# Patient Record
Sex: FEMALE | Race: WHITE | NOT HISPANIC OR LATINO | Employment: STUDENT | URBAN - METROPOLITAN AREA
[De-identification: names, ages, dates, MRNs, and addresses within clinical notes are randomized per-mention and may not be internally consistent; named-entity substitution may affect disease eponyms.]

---
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
## 2021-08-11 PROBLEM — F42.9 OBSESSIVE-COMPULSIVE DISORDER: Status: ACTIVE | Noted: 2021-08-11

## 2021-08-11 PROBLEM — B07.8 OTHER VIRAL WARTS: Status: ACTIVE | Noted: 2021-08-11

## 2021-09-10 ENCOUNTER — TELEPHONE (OUTPATIENT)
Dept: PSYCHIATRY | Facility: CLINIC | Age: 21
End: 2021-09-10

## 2021-09-13 ENCOUNTER — OFFICE VISIT (OUTPATIENT)
Dept: PSYCHIATRY | Facility: CLINIC | Age: 21
End: 2021-09-13
Payer: OTHER GOVERNMENT

## 2021-09-13 DIAGNOSIS — F32.9 DEPRESSION, REACTIVE: ICD-10-CM

## 2021-09-13 DIAGNOSIS — F42.9 OBSESSIVE-COMPULSIVE DISORDER, UNSPECIFIED TYPE: ICD-10-CM

## 2021-09-13 DIAGNOSIS — R45.88 NON-SUICIDAL SELF HARM: Primary | ICD-10-CM

## 2021-09-13 DIAGNOSIS — F41.9 ANXIETY: ICD-10-CM

## 2021-09-13 PROCEDURE — 99211 OFF/OP EST MAY X REQ PHY/QHP: CPT | Mod: PBBFAC,PO | Performed by: SOCIAL WORKER

## 2021-09-13 PROCEDURE — 90791 PSYCH DIAGNOSTIC EVALUATION: CPT | Mod: ,,, | Performed by: SOCIAL WORKER

## 2021-09-13 PROCEDURE — 90791 PR PSYCHIATRIC DIAGNOSTIC EVALUATION: ICD-10-PCS | Mod: ,,, | Performed by: SOCIAL WORKER

## 2021-09-13 PROCEDURE — 99999 PR PBB SHADOW E&M-EST. PATIENT-LVL I: ICD-10-PCS | Mod: PBBFAC,,, | Performed by: SOCIAL WORKER

## 2021-09-13 PROCEDURE — 99999 PR PBB SHADOW E&M-EST. PATIENT-LVL I: CPT | Mod: PBBFAC,,, | Performed by: SOCIAL WORKER

## 2021-09-15 PROBLEM — F32.9 DEPRESSION, REACTIVE: Status: ACTIVE | Noted: 2021-09-15

## 2021-09-15 PROBLEM — F41.9 ANXIETY: Status: ACTIVE | Noted: 2021-09-15

## 2021-09-15 PROBLEM — R45.88 NON-SUICIDAL SELF HARM: Status: ACTIVE | Noted: 2021-09-15

## 2021-09-24 ENCOUNTER — TELEPHONE (OUTPATIENT)
Dept: PSYCHIATRY | Facility: CLINIC | Age: 21
End: 2021-09-24

## 2021-09-27 ENCOUNTER — OFFICE VISIT (OUTPATIENT)
Dept: PSYCHIATRY | Facility: CLINIC | Age: 21
End: 2021-09-27
Payer: OTHER GOVERNMENT

## 2021-09-27 DIAGNOSIS — F41.9 ANXIETY: Primary | ICD-10-CM

## 2021-09-27 DIAGNOSIS — R45.88 NON-SUICIDAL SELF HARM: ICD-10-CM

## 2021-09-27 PROCEDURE — 90834 PSYTX W PT 45 MINUTES: CPT | Mod: ,,, | Performed by: SOCIAL WORKER

## 2021-09-27 PROCEDURE — 99999 PR PBB SHADOW E&M-EST. PATIENT-LVL I: CPT | Mod: PBBFAC,,, | Performed by: SOCIAL WORKER

## 2021-09-27 PROCEDURE — 99211 OFF/OP EST MAY X REQ PHY/QHP: CPT | Mod: PBBFAC,PO | Performed by: SOCIAL WORKER

## 2021-09-27 PROCEDURE — 90834 PR PSYCHOTHERAPY W/PATIENT, 45 MIN: ICD-10-PCS | Mod: ,,, | Performed by: SOCIAL WORKER

## 2021-09-27 PROCEDURE — 99999 PR PBB SHADOW E&M-EST. PATIENT-LVL I: ICD-10-PCS | Mod: PBBFAC,,, | Performed by: SOCIAL WORKER

## 2021-10-11 ENCOUNTER — OFFICE VISIT (OUTPATIENT)
Dept: PSYCHIATRY | Facility: CLINIC | Age: 21
End: 2021-10-11
Payer: OTHER GOVERNMENT

## 2021-10-11 DIAGNOSIS — F41.9 ANXIETY: ICD-10-CM

## 2021-10-11 DIAGNOSIS — F32.9 DEPRESSION, REACTIVE: Primary | ICD-10-CM

## 2021-10-11 PROCEDURE — 90837 PSYTX W PT 60 MINUTES: CPT | Mod: ,,, | Performed by: SOCIAL WORKER

## 2021-10-11 PROCEDURE — 90837 PR PSYCHOTHERAPY W/PATIENT, 60 MIN: ICD-10-PCS | Mod: ,,, | Performed by: SOCIAL WORKER

## 2021-10-22 ENCOUNTER — TELEPHONE (OUTPATIENT)
Dept: PSYCHIATRY | Facility: CLINIC | Age: 21
End: 2021-10-22

## 2021-10-25 ENCOUNTER — OFFICE VISIT (OUTPATIENT)
Dept: PSYCHIATRY | Facility: CLINIC | Age: 21
End: 2021-10-25
Payer: OTHER GOVERNMENT

## 2021-10-25 DIAGNOSIS — F32.9 DEPRESSION, REACTIVE: ICD-10-CM

## 2021-10-25 DIAGNOSIS — F41.1 GENERALIZED ANXIETY DISORDER: Primary | ICD-10-CM

## 2021-10-25 PROCEDURE — 90834 PSYTX W PT 45 MINUTES: CPT | Mod: ,,, | Performed by: SOCIAL WORKER

## 2021-10-25 PROCEDURE — 90834 PR PSYCHOTHERAPY W/PATIENT, 45 MIN: ICD-10-PCS | Mod: ,,, | Performed by: SOCIAL WORKER

## 2021-10-26 PROBLEM — F41.1 GENERALIZED ANXIETY DISORDER: Status: ACTIVE | Noted: 2021-10-26

## 2021-11-08 ENCOUNTER — OFFICE VISIT (OUTPATIENT)
Dept: PSYCHIATRY | Facility: CLINIC | Age: 21
End: 2021-11-08
Payer: OTHER GOVERNMENT

## 2021-11-08 DIAGNOSIS — F41.1 GENERALIZED ANXIETY DISORDER: Primary | ICD-10-CM

## 2021-11-08 DIAGNOSIS — F42.9 OBSESSIVE-COMPULSIVE DISORDER, UNSPECIFIED TYPE: ICD-10-CM

## 2021-11-08 DIAGNOSIS — F32.9 DEPRESSION, REACTIVE: ICD-10-CM

## 2021-11-08 PROCEDURE — 90834 PSYTX W PT 45 MINUTES: CPT | Mod: ,,, | Performed by: SOCIAL WORKER

## 2021-11-08 PROCEDURE — 90834 PR PSYCHOTHERAPY W/PATIENT, 45 MIN: ICD-10-PCS | Mod: ,,, | Performed by: SOCIAL WORKER

## 2021-11-19 ENCOUNTER — TELEPHONE (OUTPATIENT)
Dept: PSYCHIATRY | Facility: CLINIC | Age: 21
End: 2021-11-19
Payer: OTHER GOVERNMENT

## 2021-11-29 ENCOUNTER — OFFICE VISIT (OUTPATIENT)
Dept: PSYCHIATRY | Facility: CLINIC | Age: 21
End: 2021-11-29
Payer: OTHER GOVERNMENT

## 2021-11-29 DIAGNOSIS — F41.1 GENERALIZED ANXIETY DISORDER: Primary | ICD-10-CM

## 2021-11-29 DIAGNOSIS — F42.9 OBSESSIVE-COMPULSIVE DISORDER, UNSPECIFIED TYPE: ICD-10-CM

## 2021-11-29 DIAGNOSIS — F32.9 DEPRESSION, REACTIVE: ICD-10-CM

## 2021-11-29 PROCEDURE — 90837 PSYTX W PT 60 MINUTES: CPT | Mod: ,,, | Performed by: SOCIAL WORKER

## 2021-11-29 PROCEDURE — 90837 PR PSYCHOTHERAPY W/PATIENT, 60 MIN: ICD-10-PCS | Mod: ,,, | Performed by: SOCIAL WORKER

## 2021-12-14 ENCOUNTER — TELEPHONE (OUTPATIENT)
Dept: PSYCHIATRY | Facility: CLINIC | Age: 21
End: 2021-12-14
Payer: OTHER GOVERNMENT

## 2021-12-15 ENCOUNTER — TELEPHONE (OUTPATIENT)
Dept: PSYCHIATRY | Facility: CLINIC | Age: 21
End: 2021-12-15
Payer: OTHER GOVERNMENT

## 2021-12-15 ENCOUNTER — OFFICE VISIT (OUTPATIENT)
Dept: PSYCHIATRY | Facility: CLINIC | Age: 21
End: 2021-12-15
Payer: OTHER GOVERNMENT

## 2021-12-15 DIAGNOSIS — F42.9 OBSESSIVE-COMPULSIVE DISORDER, UNSPECIFIED TYPE: ICD-10-CM

## 2021-12-15 DIAGNOSIS — F32.9 DEPRESSION, REACTIVE: ICD-10-CM

## 2021-12-15 DIAGNOSIS — F41.1 GENERALIZED ANXIETY DISORDER: Primary | ICD-10-CM

## 2021-12-15 PROCEDURE — 90834 PR PSYCHOTHERAPY W/PATIENT, 45 MIN: ICD-10-PCS | Mod: 95,,, | Performed by: SOCIAL WORKER

## 2021-12-15 PROCEDURE — 90834 PSYTX W PT 45 MINUTES: CPT | Mod: 95,,, | Performed by: SOCIAL WORKER

## 2022-02-21 ENCOUNTER — TELEPHONE (OUTPATIENT)
Dept: PSYCHIATRY | Facility: CLINIC | Age: 22
End: 2022-02-21
Payer: OTHER GOVERNMENT
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
## 2022-02-22 ENCOUNTER — OFFICE VISIT (OUTPATIENT)
Dept: PSYCHIATRY | Facility: CLINIC | Age: 22
End: 2022-02-22
Payer: OTHER GOVERNMENT

## 2022-02-22 DIAGNOSIS — F41.1 GENERALIZED ANXIETY DISORDER: Primary | ICD-10-CM

## 2022-02-22 DIAGNOSIS — F42.9 OBSESSIVE-COMPULSIVE DISORDER, UNSPECIFIED TYPE: ICD-10-CM

## 2022-02-22 DIAGNOSIS — F32.9 DEPRESSION, REACTIVE: ICD-10-CM

## 2022-02-22 PROCEDURE — 90834 PSYTX W PT 45 MINUTES: CPT | Mod: S$PBB,,, | Performed by: SOCIAL WORKER

## 2022-02-22 PROCEDURE — 90834 PR PSYCHOTHERAPY W/PATIENT, 45 MIN: ICD-10-PCS | Mod: S$PBB,,, | Performed by: SOCIAL WORKER
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
## 2022-03-14 ENCOUNTER — OFFICE VISIT (OUTPATIENT)
Dept: PSYCHIATRY | Facility: CLINIC | Age: 22
End: 2022-03-14
Payer: OTHER GOVERNMENT

## 2022-03-14 DIAGNOSIS — F41.1 GENERALIZED ANXIETY DISORDER: Primary | ICD-10-CM

## 2022-03-14 DIAGNOSIS — F32.9 DEPRESSION, REACTIVE: ICD-10-CM

## 2022-03-14 DIAGNOSIS — F42.9 OBSESSIVE-COMPULSIVE DISORDER, UNSPECIFIED TYPE: ICD-10-CM

## 2022-03-14 PROCEDURE — 90834 PR PSYCHOTHERAPY W/PATIENT, 45 MIN: ICD-10-PCS | Mod: ,,, | Performed by: SOCIAL WORKER

## 2022-03-14 PROCEDURE — 90834 PSYTX W PT 45 MINUTES: CPT | Mod: ,,, | Performed by: SOCIAL WORKER

## 2022-03-28 ENCOUNTER — TELEPHONE (OUTPATIENT)
Dept: PSYCHIATRY | Facility: CLINIC | Age: 22
End: 2022-03-28
Payer: OTHER GOVERNMENT

## 2022-03-29 ENCOUNTER — OFFICE VISIT (OUTPATIENT)
Dept: PSYCHIATRY | Facility: CLINIC | Age: 22
End: 2022-03-29
Payer: OTHER GOVERNMENT

## 2022-03-29 DIAGNOSIS — F41.1 GENERALIZED ANXIETY DISORDER: Primary | ICD-10-CM

## 2022-03-29 DIAGNOSIS — F32.9 DEPRESSION, REACTIVE: ICD-10-CM

## 2022-03-29 DIAGNOSIS — F42.9 OBSESSIVE-COMPULSIVE DISORDER, UNSPECIFIED TYPE: ICD-10-CM

## 2022-03-29 PROCEDURE — 90834 PSYTX W PT 45 MINUTES: CPT | Mod: ,,, | Performed by: SOCIAL WORKER

## 2022-03-29 PROCEDURE — 90834 PR PSYCHOTHERAPY W/PATIENT, 45 MIN: ICD-10-PCS | Mod: ,,, | Performed by: SOCIAL WORKER
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
## 2022-04-25 ENCOUNTER — PATIENT MESSAGE (OUTPATIENT)
Dept: PSYCHIATRY | Facility: CLINIC | Age: 22
End: 2022-04-25
Payer: OTHER GOVERNMENT

## 2022-04-26 ENCOUNTER — OFFICE VISIT (OUTPATIENT)
Dept: PSYCHIATRY | Facility: CLINIC | Age: 22
End: 2022-04-26
Payer: OTHER GOVERNMENT

## 2022-04-26 DIAGNOSIS — F41.1 GENERALIZED ANXIETY DISORDER: Primary | ICD-10-CM

## 2022-04-26 DIAGNOSIS — F42.9 OBSESSIVE-COMPULSIVE DISORDER, UNSPECIFIED TYPE: ICD-10-CM

## 2022-04-26 DIAGNOSIS — F32.9 DEPRESSION, REACTIVE: ICD-10-CM

## 2022-04-26 PROCEDURE — 90834 PR PSYCHOTHERAPY W/PATIENT, 45 MIN: ICD-10-PCS | Mod: ,,, | Performed by: SOCIAL WORKER

## 2022-04-26 PROCEDURE — 90834 PSYTX W PT 45 MINUTES: CPT | Mod: ,,, | Performed by: SOCIAL WORKER

## 2022-04-27 NOTE — PROGRESS NOTES
"Individual Psychotherapy (LCSW)    Date: 4/26/2022  Length of Service: 45 minutes  Visit Type/Location: Face-to-Face Visit at [facility]  Therapeutic Intervention: Outpatient - Insight oriented psychotherapy 45 min - CPT code 62484 and Outpatient - Supportive psychotherapy 45 min - CPT code 90531  Patient ID: Sandie Isaac" is a 21 y.o. White female (assigned at birth).     Chief Complaint/Reason for Encounter: Psychotherapy to address depression, anxiety and non-suicidal self-harm.     Interval History and Content of Current Session: Pt presented to session on time, attentive/alert, appropriately dressed for age/weather/occasion, calm, composed, in no visible/noticeable distress. Pt just got back from [place] and reported having a really good time. Pt has 2 weeks of school left and feels confident about finals. Pt was hired at [employer] and will be starting at the end of May, which she expressed some anxiety about. Pt processed this anxiety with LCSW. LCSW helped Pt cope with adjusting to change and coping with uncertainty. LCSW and Pt also discussed plans for the summer and ways to maintain her mental health. Pt reported having several plans to travel, which she is looking forward to. Pt reported feeling less anxious about time apart from her boyfriend due to use of coping skills and increased ability to challenge unhelpful thinking patterns.    The remainder of session was spent discussing plan for treatment going forward. LCSW shared plan to [place]locate out of state and discussed termination/transfer of care with Pt. Pt did not have strong reaction and seemed accepting of the news. According to Pt, she had planned to discuss reducing frequency of visits due to progress and stability. Pt was informed of possible gap in treatment due to limited staff/lack of availability. LCSW will explore transfer of care options with clinic manager. Clinic staff will reach out to Pt once reassigned " to a new therapist. LCSW encouraged Pt to call the clinic if symptoms re-emerge and begin to impact ability to function.      Pt's affect was appropriate, but somewhat constricted as usual, with congruent mood. Pt's thought process was logical with no signs of disorganized thinking.      Treatment Plan:  · Goals:   · Reduce/eliminate incidences of non-suicidal self-harm by...  · Identifying at least 2 triggers that lead to non-suicidal self-harm.   · Developing and applying at least 2 alternative coping skills to manage difficult emotions.  · Improve ability to adjust by...  · Learning at least 2 skills to cope with life's stressors and changes.   · Target Symptoms: anxiety , adjustment, and non-suicidal self-harm  · Therapeutic Intervention Type: insight oriented psychotherapy, behavior modifying psychotherapy, supportive psychotherapy  · Why chosen therapy is appropriate versus another modality: relevant to diagnosis, patient responds to this modality, evidence based practice  · Outcome Monitoring Methods: self-report, observation, checklist/rating scale    Risk Parameters:  Pt reported no suicidal ideation.  Pt reported no homicidal ideation.  Pt reported no self-injurious behavior.  Pt reported no aggressive/violent behavior.    Verbal deficits: None observed.     Patient's response to intervention:  Pt was engaged, interested, cooperative, open-minded, attentive and reserved during session. Pt was accepting of interventions and seemed motivated to continue treatment.    Progress toward goals and other mental status changes:  Pt's progress toward goals is good.    Diagnosis:     ICD-10-CM ICD-9-CM   1. Generalized anxiety disorder  F41.1 300.02   2. Obsessive-compulsive disorder, unspecified type  F42.9 300.3   3. Depression, reactive  F32.9 300.4     Plan:   - Pt will apply knowledge and skills learned to manage symptoms and maintain mental health.   - Pt will go to the ED or call 911 if experiencing signs of  mental health deterioration and concerns of danger to self/others.   - LCSW will discuss transfer of care with clinic manager.  - Clinic staff will reach out to Pt once reassigned to a new therapist.    Return to Clinic: for psychotherapy once assigned to a new therapist.     Latha Neal LCSW

## 2022-07-06 PROBLEM — H04.129 DRY EYE: Status: ACTIVE | Noted: 2022-07-06

## 2022-07-06 PROBLEM — R53.82 CHRONIC FATIGUE: Status: ACTIVE | Noted: 2022-07-06

## 2022-07-06 PROBLEM — R45.88 NON-SUICIDAL SELF-HARM: Status: RESOLVED | Noted: 2021-09-15 | Resolved: 2022-07-06

## 2022-07-06 PROBLEM — M54.50 ACUTE BILATERAL LOW BACK PAIN WITHOUT SCIATICA: Status: ACTIVE | Noted: 2022-07-06

## 2022-12-29 ENCOUNTER — LAB VISIT (OUTPATIENT)
Dept: LAB | Facility: HOSPITAL | Age: 22
End: 2022-12-29
Attending: STUDENT IN AN ORGANIZED HEALTH CARE EDUCATION/TRAINING PROGRAM
Payer: OTHER GOVERNMENT

## 2022-12-29 ENCOUNTER — OFFICE VISIT (OUTPATIENT)
Dept: OTOLARYNGOLOGY | Facility: CLINIC | Age: 22
End: 2022-12-29
Payer: OTHER GOVERNMENT

## 2022-12-29 VITALS — BODY MASS INDEX: 23.26 KG/M2 | HEIGHT: 64 IN | WEIGHT: 136.25 LBS

## 2022-12-29 DIAGNOSIS — E04.9 ENLARGED THYROID: Primary | ICD-10-CM

## 2022-12-29 DIAGNOSIS — R22.0 CHEEK MASS: ICD-10-CM

## 2022-12-29 DIAGNOSIS — E04.9 ENLARGED THYROID: ICD-10-CM

## 2022-12-29 LAB
T4 FREE SERPL-MCNC: 0.79 NG/DL (ref 0.71–1.51)
TSH SERPL DL<=0.005 MIU/L-ACNC: 0.6 UIU/ML (ref 0.4–4)

## 2022-12-29 PROCEDURE — 99999 PR PBB SHADOW E&M-EST. PATIENT-LVL III: CPT | Mod: PBBFAC,,, | Performed by: STUDENT IN AN ORGANIZED HEALTH CARE EDUCATION/TRAINING PROGRAM

## 2022-12-29 PROCEDURE — 99204 OFFICE O/P NEW MOD 45 MIN: CPT | Mod: S$PBB,,, | Performed by: STUDENT IN AN ORGANIZED HEALTH CARE EDUCATION/TRAINING PROGRAM

## 2022-12-29 PROCEDURE — 99204 PR OFFICE/OUTPT VISIT, NEW, LEVL IV, 45-59 MIN: ICD-10-PCS | Mod: S$PBB,,, | Performed by: STUDENT IN AN ORGANIZED HEALTH CARE EDUCATION/TRAINING PROGRAM

## 2022-12-29 PROCEDURE — 99999 PR PBB SHADOW E&M-EST. PATIENT-LVL III: ICD-10-PCS | Mod: PBBFAC,,, | Performed by: STUDENT IN AN ORGANIZED HEALTH CARE EDUCATION/TRAINING PROGRAM

## 2022-12-29 PROCEDURE — 84443 ASSAY THYROID STIM HORMONE: CPT | Performed by: STUDENT IN AN ORGANIZED HEALTH CARE EDUCATION/TRAINING PROGRAM

## 2022-12-29 PROCEDURE — 36415 COLL VENOUS BLD VENIPUNCTURE: CPT | Mod: PO | Performed by: STUDENT IN AN ORGANIZED HEALTH CARE EDUCATION/TRAINING PROGRAM

## 2022-12-29 PROCEDURE — 84439 ASSAY OF FREE THYROXINE: CPT | Performed by: STUDENT IN AN ORGANIZED HEALTH CARE EDUCATION/TRAINING PROGRAM

## 2022-12-29 PROCEDURE — 99213 OFFICE O/P EST LOW 20 MIN: CPT | Mod: PBBFAC,PO | Performed by: STUDENT IN AN ORGANIZED HEALTH CARE EDUCATION/TRAINING PROGRAM

## 2022-12-29 NOTE — PROGRESS NOTES
Otolaryngology Clinic Note    Subjective:       Patient ID: Sandie Bradley is a 22 y.o. female.    Chief Complaint: Mass (Right cheek and under chin)      History of Present Illness: Sandie Bradley is a 22 y.o. female presenting with face masses, saw derm who recommended ENT. Has small lump in her right cheek, has small spot under chin, which is smaller now. Right cheek with swell and gets tender. Is small now. No skin changes. Present for 3 months or so. Swelling is random, no menstrual association, Thinks it was related to wisdom teeth removal maybe but that was a year ago. Thinks leaning on or picking at will make it swell. No other dental issues. No smoking. No fatigue, night sweats, weight loss. No FH of lymphoma/leukemia. Dad have covid in feb and October. Mom has Grave's.   No heat or cold intolerance, no weight issues loss or gain, no hair or skin issues. Energy levels low/ok. Mood normal but on zoloft. Not sure about brain fog. No real concentration issues.       No past surgical history on file.  No past medical history on file.  Social Determinants of Health     Tobacco Use: Low Risk     Smoking Tobacco Use: Never    Smokeless Tobacco Use: Never    Passive Exposure: Not on file   Alcohol Use: Not on file   Financial Resource Strain: Not on file   Food Insecurity: Not on file   Transportation Needs: Not on file   Physical Activity: Not on file   Stress: Not on file   Social Connections: Not on file   Housing Stability: Not on file   Depression: Not on file     Review of patient's allergies indicates:  No Known Allergies  Current Outpatient Medications   Medication Instructions    sertraline (ZOLOFT) 100 mg, Oral, Daily         ENT ROS negative except as stated above.             Objective:      There were no vitals filed for this visit.    General: NAD, well appearing  Eyes: Normal conjunctiva and lids  Face: symmetric, nerve intact, 1 cm lymph node facial artery over mandible on right.   Nose: The nose is  without any evidence of any deformity. The nasal mucosa is moist. The septum is midline. There is no evidence of septal hematoma. The turbinates are without abnormality.   Ears: The ears are with normal-appearing pinna. Examination of the canals is normal appearing bilaterally. There is no drainage or erythema noted. The tympanic membranes are normal appearing with pearly color, normal-appearing landmarks and normal light reflex. Hearing is grossly intact.  Mouth: No obvious abnormalities to the lips. The teeth are unremarkable. The gingivae are without any obvious evidence of infection or lesion. The oral mucosa is moist and pink. There are no obvious masses to the hard or soft palate.   Oropharynx: The uvula is midline.  The tongue is midline. The posterior pharynx is without erythema or exudate. The tonsils are normal appearing.  Salivary glands: The salivary glands are symmetric and not enlarged, no masses  Neck: No lymphadenopathy, trachea midline, thryoid enlarged.  Psych: Normal mood and affect.   Neuro: Grossly intact  Speech: fluent       Assessment and Plan:       1. Enlarged thyroid    2. Cheek mass          TSH/Free T4  Thyroid US and US over right jawline and chin.     RTC: 1 week after US    Plan of care was discussed in detail with the patient, who agreed with the plan as above. All questions were answered in detail.     Nery Narvaez MD  Otolaryngology

## 2023-01-03 ENCOUNTER — HOSPITAL ENCOUNTER (OUTPATIENT)
Dept: RADIOLOGY | Facility: HOSPITAL | Age: 23
Discharge: HOME | End: 2023-01-03
Attending: STUDENT IN AN ORGANIZED HEALTH CARE EDUCATION/TRAINING PROGRAM
Payer: OTHER GOVERNMENT

## 2023-01-03 DIAGNOSIS — R22.0 CHEEK MASS: ICD-10-CM

## 2023-01-03 DIAGNOSIS — E04.9 ENLARGED THYROID: ICD-10-CM

## 2023-01-03 PROCEDURE — 76536 US EXAM OF HEAD AND NECK: CPT | Mod: TC,PO

## 2023-01-03 PROCEDURE — 76536 US EXAM OF HEAD AND NECK: CPT | Mod: 26,,, | Performed by: RADIOLOGY

## 2023-01-03 PROCEDURE — 76536 US SOFT TISSUE HEAD NECK THYROID: ICD-10-PCS | Mod: 26,,, | Performed by: RADIOLOGY

## 2023-01-10 ENCOUNTER — OFFICE VISIT (OUTPATIENT)
Dept: OTOLARYNGOLOGY | Facility: CLINIC | Age: 23
End: 2023-01-10
Payer: OTHER GOVERNMENT

## 2023-01-10 VITALS — WEIGHT: 134.69 LBS | HEIGHT: 64 IN | BODY MASS INDEX: 22.99 KG/M2

## 2023-01-10 DIAGNOSIS — E04.1 THYROID NODULE: ICD-10-CM

## 2023-01-10 DIAGNOSIS — R22.0 CHEEK MASS: Primary | ICD-10-CM

## 2023-01-10 PROCEDURE — 99213 OFFICE O/P EST LOW 20 MIN: CPT | Mod: S$PBB,,, | Performed by: STUDENT IN AN ORGANIZED HEALTH CARE EDUCATION/TRAINING PROGRAM

## 2023-01-10 PROCEDURE — 99213 PR OFFICE/OUTPT VISIT, EST, LEVL III, 20-29 MIN: ICD-10-PCS | Mod: S$PBB,,, | Performed by: STUDENT IN AN ORGANIZED HEALTH CARE EDUCATION/TRAINING PROGRAM

## 2023-01-10 PROCEDURE — 99212 OFFICE O/P EST SF 10 MIN: CPT | Mod: PBBFAC,PO | Performed by: STUDENT IN AN ORGANIZED HEALTH CARE EDUCATION/TRAINING PROGRAM

## 2023-01-10 PROCEDURE — 99999 PR PBB SHADOW E&M-EST. PATIENT-LVL II: CPT | Mod: PBBFAC,,, | Performed by: STUDENT IN AN ORGANIZED HEALTH CARE EDUCATION/TRAINING PROGRAM

## 2023-01-10 PROCEDURE — 99999 PR PBB SHADOW E&M-EST. PATIENT-LVL II: ICD-10-PCS | Mod: PBBFAC,,, | Performed by: STUDENT IN AN ORGANIZED HEALTH CARE EDUCATION/TRAINING PROGRAM

## 2023-01-10 RX ORDER — PREDNISONE 20 MG/1
20 TABLET ORAL DAILY
Qty: 5 TABLET | Refills: 0 | Status: SHIPPED | OUTPATIENT
Start: 2023-01-10 | End: 2023-01-15

## 2023-01-10 NOTE — PROGRESS NOTES
Otolaryngology Clinic Note    Subjective:       Patient ID: Sandie Bradley is a 22 y.o. female.    Chief Complaint: Follow-up (Review test results)      History of Present Illness: Sandie Bradley is a 22 y.o. female presenting with face masses, saw derm who recommended ENT. Has small lump in her right cheek, has small spot under chin, which is smaller now. Right cheek with swell and gets tender. Is small now. No skin changes. Present for 3 months or so. Swelling is random, no menstrual association, Thinks it was related to wisdom teeth removal maybe but that was a year ago. Thinks leaning on or picking at will make it swell. No other dental issues. No smoking. No fatigue, night sweats, weight loss. No FH of lymphoma/leukemia. Dad have covid in feb and October. Mom has Grave's.   No heat or cold intolerance, no weight issues loss or gain, no hair or skin issues. Energy levels low/ok. Mood normal but on zoloft. Not sure about brain fog. No real concentration issues.     1/10/23: Right node has swollen and itched since last seen. Has US and Thyroid labs done. Lymph node on right cheek, normal nodular thyroid on US.       No past surgical history on file.  No past medical history on file.  Social Determinants of Health     Tobacco Use: Low Risk     Smoking Tobacco Use: Never    Smokeless Tobacco Use: Never    Passive Exposure: Not on file   Alcohol Use: Not on file   Financial Resource Strain: Not on file   Food Insecurity: Not on file   Transportation Needs: Not on file   Physical Activity: Not on file   Stress: Not on file   Social Connections: Not on file   Housing Stability: Not on file   Depression: Not on file     Review of patient's allergies indicates:  No Known Allergies  Current Outpatient Medications   Medication Instructions    predniSONE (DELTASONE) 20 mg, Oral, Daily    sertraline (ZOLOFT) 100 mg, Oral, Daily         ENT ROS negative except as stated above.             Objective:      There were no vitals  filed for this visit.    General: NAD, well appearing  Eyes: Normal conjunctiva and lids  Face: symmetric, nerve intact, 1 cm lymph node facial artery over mandible on right.   Nose: The nose is without any evidence of any deformity. The nasal mucosa is moist. The septum is midline. There is no evidence of septal hematoma. The turbinates are without abnormality.   Ears: The ears are with normal-appearing pinna. Examination of the canals is normal appearing bilaterally. There is no drainage or erythema noted. The tympanic membranes are normal appearing with pearly color, normal-appearing landmarks and normal light reflex. Hearing is grossly intact.  Mouth: No obvious abnormalities to the lips. The teeth are unremarkable. The gingivae are without any obvious evidence of infection or lesion. The oral mucosa is moist and pink. There are no obvious masses to the hard or soft palate.   Oropharynx: The uvula is midline.  The tongue is midline. The posterior pharynx is without erythema or exudate. The tonsils are normal appearing.  Salivary glands: The salivary glands are symmetric and not enlarged, no masses  Neck: No lymphadenopathy, trachea midline, thryoid enlarged.  Psych: Normal mood and affect.   Neuro: Grossly intact  Speech: fluent     Thyroid US: Impression:     1. 10 x 5 x 10 mm probable reactive lymph node within the subcutaneous soft tissues of the right cheek which correlates with the patient's area of palpable concern.  2. Normal size multinodular thyroid gland.  No nodules which meet the criteria for FNA/core biopsy.  No pathologically enlarged or morphologically abnormal lymph nodes in the left or right neck adjacent to the thyroid fossa.    Assessment and Plan:       1. Cheek mass    2. Thyroid nodule            Reviewed ultrasound. Would give this lymph node time to resolve as scar and facial nerve weakness are risks that I would not recommend at this time with excision. Offered steroids to see if this  will shrink. She would like to try.   Nodules in thyroid are small and thyroid is not enlarged so no need to follow unless she notices changes. Labs are normal.     RTC: PRN, advised to contact if still present in 9-12 months and we can re-assess.     Plan of care was discussed in detail with the patient, who agreed with the plan as above. All questions were answered in detail.     Nery Narvaez MD  Otolaryngology

## 2024-03-18 ENCOUNTER — TELEPHONE (OUTPATIENT)
Dept: OTOLARYNGOLOGY | Facility: CLINIC | Age: 24
End: 2024-03-18
Payer: OTHER GOVERNMENT

## 2024-03-18 NOTE — TELEPHONE ENCOUNTER
----- Message from Annel Sanders sent at 3/18/2024  9:15 AM CDT -----  Regarding: sooner apt  Contact: pt  Type:  Sooner Appointment Request    Caller is requesting a sooner appointment.  Caller declined first available appointment listed below.  Caller will not accept being placed on the waitlist and is requesting a message be sent to doctor.    Name of Caller:  patient  When is the first available appointment?  N/a  Symptoms:  tonsils trouble  Would the patient rather a call back or a response via [facility]?   Best Call Back Number:  [phone]    Additional Information:  call to be seen thanks.

## 2024-03-19 ENCOUNTER — OFFICE VISIT (OUTPATIENT)
Dept: OTOLARYNGOLOGY | Facility: CLINIC | Age: 24
End: 2024-03-19
Payer: OTHER GOVERNMENT

## 2024-03-19 VITALS — HEIGHT: 64 IN | WEIGHT: 158.94 LBS | BODY MASS INDEX: 27.13 KG/M2

## 2024-03-19 DIAGNOSIS — R19.6 HALITOSIS: ICD-10-CM

## 2024-03-19 DIAGNOSIS — H61.21 IMPACTED CERUMEN OF RIGHT EAR: ICD-10-CM

## 2024-03-19 DIAGNOSIS — J35.8 TONSILLOLITH: Primary | ICD-10-CM

## 2024-03-19 DIAGNOSIS — J35.8 TONSIL STONE: ICD-10-CM

## 2024-03-19 DIAGNOSIS — H93.8X1 EAR PRESSURE, RIGHT: ICD-10-CM

## 2024-03-19 PROCEDURE — 69210 REMOVE IMPACTED EAR WAX UNI: CPT | Mod: S$PBB,,, | Performed by: NURSE PRACTITIONER

## 2024-03-19 PROCEDURE — 99213 OFFICE O/P EST LOW 20 MIN: CPT | Mod: 25,S$PBB,, | Performed by: NURSE PRACTITIONER

## 2024-03-19 PROCEDURE — 99999 PR PBB SHADOW E&M-EST. PATIENT-LVL III: CPT | Mod: PBBFAC,,, | Performed by: NURSE PRACTITIONER

## 2024-03-19 PROCEDURE — 69210 REMOVE IMPACTED EAR WAX UNI: CPT | Mod: PBBFAC,PO | Performed by: NURSE PRACTITIONER

## 2024-03-19 PROCEDURE — 99213 OFFICE O/P EST LOW 20 MIN: CPT | Mod: PBBFAC,PO,25 | Performed by: NURSE PRACTITIONER

## 2024-03-19 NOTE — PROGRESS NOTES
Otolaryngology Clinic Note    Subjective:       Patient ID: Sandie Bradley is a 23 y.o. female.    Chief Complaint: tonsil stones      History of Present Illness: Sandie Bradley is a 23 y.o. female presenting with tonsil tones. She gets them only on the right side. She states she has gotten them when she was smaller when she was sick.     She first noticed a stone a few weeks ago, she was sick at the time. She has been able to dislodge them with a q-tip. She noticed multiple this past weekend. She states food is getting stuck. She feels like the tonsil may be swollen. She denies any sore throat. She has some ear pain/pressure in the right ear, she is rating it a 2/10. She denies any strep infection. She does feel like she has halitosis. She is occ using mouth wash. She is using salt water gargles. She is drinking a lot of milk with her coffee and tea. She      Past Surgical History:   Procedure Laterality Date    WISDOM TOOTH EXTRACTION      4 removed.     No past medical history on file.  Social Determinants of Health     Tobacco Use: Low Risk  (3/19/2024)    Patient History     Smoking Tobacco Use: Never     Smokeless Tobacco Use: Never     Passive Exposure: Not on file   Alcohol Use: Not on file   Financial Resource Strain: Not on file   Food Insecurity: Not on file   Transportation Needs: Not on file   Physical Activity: Not on file   Stress: Not on file   Social Connections: Not on file   Housing Stability: Not on file   Depression: Low Risk  (11/27/2023)    Depression     Last PHQ-4: Flowsheet Data: 0     Review of patient's allergies indicates:  No Known Allergies  Current Outpatient Medications   Medication Instructions    sertraline (ZOLOFT) 100 mg, Oral, Daily         ENT ROS negative except as stated above.     Patient answers are not available for this visit.            Objective:      There were no vitals filed for this visit.    General: NAD, well appearing  Eyes: Normal conjunctiva and lids  Face:  symmetric, nerve intact  Nose: The nose is without any evidence of any deformity. The nasal mucosa is moist. The septum is midline. There is no evidence of septal hematoma. The turbinates are without abnormality.   Ears: The ears are with normal-appearing pinna. Examination of the canals is normal appearing bilaterally, after removal of impacted ear wax in right canal. There is no drainage or erythema noted. The tympanic membranes are normal appearing with pearly color, normal-appearing landmarks and normal light reflex. Hearing is grossly intact.  Mouth: No obvious abnormalities to the lips. The teeth are unremarkable. The gingivae are without any obvious evidence of infection or lesion. The oral mucosa is moist and pink. There are no obvious masses to the hard or soft palate.   Oropharynx: The uvula is midline.  The tongue is midline. The posterior pharynx is without erythema or exudate. The tonsils are normal appearing.  Salivary glands: The salivary glands are symmetric and not enlarged, no masses  Neck: No lymphadenopathy, trachea midline, thryoid not enlarged.  Psych: Normal mood and affect.   Neuro: Grossly intact  Speech: fluent    Procedure:   Cerumen impaction removal  Indications: Cerumen impaction  Verbal consent obtained.   Under microscope, wax was removed from right ear using suction.  Patient tolerated procedure well.       Assessment and Plan:       1. Tonsillolith    2. Tonsil stone    3. Impacted cerumen of right ear    4. Halitosis    5. Ear pressure, right        -none seen today  -continue to gargle with salt water  -cough vigorously to try and dislodge the stones.  ·use a water pick to flush tonsil stones out.  ·use a cotton swab to gently push the tonsil stones out.  -may gargle with mouth wash to help with halitosis  -may use OTC pain relievers for ear discomfort that I believe is referred pain from her removing the tonsil stones    RTC: PRN    Plan of care was discussed in detail with the  patient, who agreed with the plan as above. All questions were answered in detail.     Lata Winston, FNP-C  Otolaryngology

## 2024-08-29 ENCOUNTER — OFFICE VISIT (OUTPATIENT)
Dept: PSYCHIATRY | Facility: CLINIC | Age: 24
End: 2024-08-29
Payer: OTHER GOVERNMENT

## 2024-08-29 VITALS
WEIGHT: 142.75 LBS | HEIGHT: 65 IN | HEART RATE: 98 BPM | BODY MASS INDEX: 23.78 KG/M2 | SYSTOLIC BLOOD PRESSURE: 114 MMHG | DIASTOLIC BLOOD PRESSURE: 77 MMHG

## 2024-08-29 DIAGNOSIS — F42.9 OBSESSIVE-COMPULSIVE DISORDER, UNSPECIFIED TYPE: ICD-10-CM

## 2024-08-29 DIAGNOSIS — F41.1 GENERALIZED ANXIETY DISORDER: Primary | ICD-10-CM

## 2024-08-29 PROCEDURE — 99213 OFFICE O/P EST LOW 20 MIN: CPT | Mod: PBBFAC,PO

## 2024-08-29 PROCEDURE — 99999 PR PBB SHADOW E&M-EST. PATIENT-LVL III: CPT | Mod: PBBFAC,,,

## 2024-08-29 PROCEDURE — 90792 PSYCH DIAG EVAL W/MED SRVCS: CPT | Mod: ,,,

## 2024-08-29 RX ORDER — SERTRALINE HYDROCHLORIDE 100 MG/1
100 TABLET, FILM COATED ORAL DAILY
Qty: 90 TABLET | Refills: 1
Start: 2024-08-29 | End: 2025-08-29

## 2024-08-29 RX ORDER — HYDROXYZINE HYDROCHLORIDE 25 MG/1
25 TABLET, FILM COATED ORAL 3 TIMES DAILY PRN
Qty: 270 TABLET | Refills: 1 | Status: SHIPPED | OUTPATIENT
Start: 2024-08-29

## 2024-08-29 NOTE — PROGRESS NOTES
"OUTPATIENT PSYCHIATRY INITIAL VISIT    Encounter Date: 09/04/2024    ID: Sandie Bradley, a 23 y.o. female, presenting for initial evaluation visit. Met with patient and mother. Informed of confidentiality rights and limitations. Discussed provider role in the treatment team.    Reason for Encounter: Referral from Wilfredo Joya*   Chief Complaint   Patient presents with    Medication Management    Anxiety     OCD     CC: "I've struggled with anxiety and OCD for a long time."    HISTORY OF PRESENT ILLNESS:   Pt. is a 23 y.o. female, with a past psychiatric hx of OCD and BRIA presenting to the clinic for an initial evaluation and treatment. PMHx outlined below. Pt is currently taking sertraline 100 mg daily.     Pt reports a history of anxiety and OCD beginning in 6th grade. She started medication and psychotherapy at that time and symptoms improved, "it was manageable." However, symptoms have markedly worsened over the last week. She reports anxiety and worry, obsessive intrusive thoughts, compulsions, crying spells, and abdominal upset. She has been unable to attend work or school over the last week. She reports feeling guilt "about random past things I shouldn't feel guilty about." She has taken sertraline for more than 10 years with the highest dose being 150 mg daily. She has taken 50 mg daily for the past year, but increased this to 100 mg daily several days ago after experiencing increased anxiety. Recent stressors contributing to worsening symptoms include the semester starting recently, her sister moved back to campus, her cat was injured, and the onset of menses. She has completed all coursework for a bachelor of arts in psychology but is unable to graduate because she declared a minor in art but has not completed this.      PSYCHIATRIC REVIEW OF SYMPTOMS  Is patient currently experiencing or having changes in:    Mood/Depression:  Mood:  anxious, some low mood, depression over the last " week  Interest/pleasure/anhedonia: yes, difficulty getting out of bed  Guilt/worthlessness/hopelessness: irrational feelings of guilt  Sleep: early morning awakening with anxiety and nervous stomach sick feeling  Energy: decreased over the last week  Appetite/weight: decreased appetite over the last week   Concentration/indecisiveness: good, no change   Psychomotor activity: no  S.I.B.s/risky behavior: no  SI: no    Anxiety:  Excessive anxiety and worry: yes  Restlessness/'on edge': yes  Irritability: no  Muscle tension: yes, bruxism   Difficulty concentrating/mind going blank: no   Sleep disturbance: yes  Fatigues easily: yes  Panic attacks: yes, recognizes this so is able to calm herself, 2 times in the last week   Agoraphobia: over the last week   Social phobia: no    PTSD:  Recurrent nightmares: no  Flashbacks: no  Avoidance of stimuli: no  Hyper startle response: no   Dissociative episodes: no    OCD:  Recurrent thoughts: yes, see HPI   Recurrent behaviors: yes, see HPI     Zaira/Hypomania:   Distractibility: no  Indiscretion: no  Grandiosity: no   Racing thoughts/Flight of ideas: no  Increased activity: no  Reduced need for sleep: no  Talkativeness/Pressured speech: no     Psychosis:   A/V hallucinations: no  Delusions: no  Paranoia: no      PSYCHOTROPIC MEDICATION HISTORY (Highest Dose)  denies    PAST PSYCHIATRIC HISTORY:  Psychiatric Care (current & past):  Pediatrician, peds psych, and most recently PCP   Previous Psychiatric Diagnoses:  OCD and anxiety  Previous Psychiatric Hospitalizations: denies  Previous SI/HI:  denies  Previous Suicide Attempts or NSSI: hx of cutting in 2018, denies recent NSSI  History of Psychotherapy: with Marj Neal LCSW in 2021  History of Violence: denies    PAST MEDICAL HISTORY:   History reviewed. No pertinent past medical history. Wish to become pregnant in the immediate future[if female of childbearing age]: denies    NEUROLOGIC HISTORY:  Seizures:  denies   Head trauma:   denies    PAST SURGICAL HISTORY:  Past Surgical History:   Procedure Laterality Date    WISDOM TOOTH EXTRACTION      4 removed.       Review of patient's allergies indicates:  No Known Allergies     FAMILY HISTORY:   Paternal: father, undiagnosed OCD; no history of substance abuse or suicide  Maternal: maternal great grandmother depression; no history of substance abuse or suicide  Siblings: sister anxiety    SOCIAL HISTORY:   Developmental/Childhood: born in [place], moved here at age 3, father in the    Marital Status/Relationship Status: single   Children: denies  Resides/Housing Status: in [place] with parents  Occupation/Employment: artist and works at [employer]   Hobbies/Recreational Activities: art and hang out with family and friends, volleyball   Spirituality/Yazidi: Hoahaoism   Education level: bachelor's in psychology    History: denies, father was in    Legal History: denies  Access to firearms: denies     SUBSTANCE USE HISTORY:  Caffeine: avoids d/t increase in anxiety  Tobacco: denies  Alcohol: denies  Other Substances: denies  Rehab: denies      CURRENT MEDICATIONS  Outpatient Encounter Medications as of 8/29/2024   Medication Sig Dispense Refill    [DISCONTINUED] sertraline (ZOLOFT) 50 MG tablet Take 1 tablet (50 mg total) by mouth once daily. 30 tablet 0    hydrOXYzine HCL (ATARAX) 25 MG tablet Take 1 tablet (25 mg total) by mouth 3 (three) times daily as needed for Anxiety. 270 tablet 1    pantoprazole (PROTONIX) 40 MG tablet Take 40 mg by mouth once daily. (Patient not taking: Reported on 8/29/2024)      sertraline (ZOLOFT) 100 MG tablet Take 1 tablet (100 mg total) by mouth once daily. 90 tablet 1     No facility-administered encounter medications on file as of 8/29/2024.       LABORATORY DATA  No visits with results within 1 Month(s) from this visit.   Latest known visit with results is:   Lab Visit on 06/05/2024   Component Date Value Ref Range Status    TSH  "06/05/2024 0.445  0.400 - 4.000 uIU/mL Final    T4, Total 06/05/2024 7.4  4.5 - 11.5 ug/dL Final    T3, Free 06/05/2024 3.78  2.77 - 5.27 pg/mL Final    Free T4 06/05/2024 1.13  0.78 - 2.19 ng/dL Final    T3, Total 06/05/2024 113  60 - 180 ng/dL Final    T4, Total 06/05/2024 7.6  4.5 - 11.5 ug/dL Final    TSH 06/05/2024 0.442  0.400 - 4.000 uIU/mL Final    Sodium 06/05/2024 139  136 - 145 mmol/L Final    Potassium 06/05/2024 4.6  3.5 - 5.1 mmol/L Final    Chloride 06/05/2024 102  95 - 110 mmol/L Final    CO2 06/05/2024 25  22 - 31 mmol/L Final    Glucose 06/05/2024 83  70 - 110 mg/dL Final    BUN 06/05/2024 16  7 - 18 mg/dL Final    Creatinine 06/05/2024 0.74  0.50 - 1.40 mg/dL Final    Calcium 06/05/2024 10.0  8.4 - 10.2 mg/dL Final    Anion Gap 06/05/2024 12  5 - 12 mmol/L Final    eGFR 06/05/2024 >60  >60 mL/min/1.73 m^2 Corrected    Thyroperoxidase Antibodies 06/05/2024 0.8  0.0 - 9.0 IU/mL Final    Thyroglobulin Ab Screen 06/05/2024 <0.9  0.0 - 4.0 IU/mL Final       MEDICAL REVIEW OF SYSTEMS:  Pain: Denies any significant chronic or acute pain.  Constitutional: Denies fever or change in appetite.  Cardiovascular: Denies chest pain or exertional dyspnea.  Respiratory: Denies cough or orthopnea.   GI: Denies abdominal pain, N/V  Neurological: Denies tremor, seizure, or focal weakness.  Psychiatric: See HPI above.    EXAM:  Nutritional Screening: Considering the patient's height and weight, medications, medical history and preferences, should a referral be made to the dietitian? No    Vitals: most recent vital signs were reviewed:  /77   Pulse 98   Ht 5' 5" (1.651 m)   Wt 64.8 kg (142 lb 12 oz)   LMP 08/20/2024 (Exact Date)   BMI 23.75 kg/m²     General: age appropriate, well nourished, casually dressed, neatly groomed  MSK: muscle strength/tone: no tremor or abnormal movements.   Gait/Station: no ataxia, steady    PSYCHIATRIC:  Speech: Normal rate, rhythm, volume. No latency, no pressured " "speech  Mood/Affect: anxious, congruent, and appropriate   Though Process: organized, logical, linear  Thought Content: no suicidal or homicidal ideation, no A/V hallucinations, delusions, or paranoia  Insight: Intact; aware of illness  Judgement: behavior is adequate to circumstances  Orientation: A&O x 4  Memory: Intact for content of interview, 3/3 immediate, 3/3 after 3 mins. Able to recall recent and remote events.  Language: Grossly intact, no aphasias   Concentration: Spells "world" correctly forward & backwards  Knowledge/Intelligence: appropriate to age and level of education.     SUICIDE RISK ASSESSMENT:  Protective factors: age, gender, no prior attempts, no prior hospitalizations, no family h/o attempts, no ongoing substance abuse, no psychosis, denies SI/intent/plan, seeking treatment, access to treatment, future oriented, good primary support, no access to firearms  Risks: hx OCD and BRIA, hx NSSI in 2018  Patient is a low immediate and long-term risk considering risk factors.       IMPRESSION:    Sandie Bradley is a 23 y.o. female that appears to be a reliable informant and is committed to working towards the goals of the treatment plan. Patient has a history of OCD and BRIA. Presents today with symptoms of same, see HPI.       DIAGNOSES:    ICD-10-CM ICD-9-CM   1. Generalized anxiety disorder  F41.1 300.02   2. Obsessive-compulsive disorder, unspecified type  F42.9 300.3         STRENGTHS AND LIABILITIES: Strength: Patient accepts guidance/feedback, Strength: Patient is expressive/articulate., Strength: Patient is intelligent., Strength: Patient is motivated for change., Strength: Patient is physically healthy., Strength: Patient has positive support network., Strength: Patient has reasonable judgment., Liability: Patient lacks coping skills.    TREATMENT GOALS:      Anxiety: Identify coping skills including deep breathing, grounding, time set aside for worry, and other identified skills, decrease in " presenting anxiety related symptoms, and increased client rating of quality of life. Participate in psychotherapy as indicted. Medication adherence.    OCD:  Reduce the frequency, intensity, and duration of obsessions.  Reduce time involved with or interference from obsessions and compulsions.  Function daily at a consistent level with minimal interference from obsessions and compulsions.  Resolved key life conflicts and the emotional stress that fuels obsessive-compulsive behavior patterns.  Let go of key thoughts, beliefs, and past life events in order to maximize time free from obsessions and compulsions.  Medication adherence.      PLAN:  Continue sertraline 100 mg daily for BRIA and OCD  Start hydroxyzine 25 mg t.i.d. p.r.n. anxiety  Referral placed for individual psychotherapy.      Return to Clinic: 1 month      ROLAND Nice, PMHNP-BC      60 minutes spent on this encounter, >50% time spent in counseling.     At this time there are no indications the patient represents an imminent danger to either themselves or others; will continue to manage treatment in the outpatient setting.    I discussed the patient's care with the patient including benefits, alternatives, possible adverse effects of the treatment plan; including the potential for metabolic complications, major organ dysfunction, black box warnings, and contraindications. The opportunity was given for questions/clarification, and after this discussion the above treatment plan was devised through shared decision making. The patient voiced their understanding of the diagnoses and treatments listed above and agreed to the treatment plan. Follow up plan was reviewed with the patient. The patient was advised to call to report any worsening of symptoms or problems with medication.    Supportive therapy and psychoeducation provided. I discussed the importance of regular exercise, maintenance of a healthy weight, balanced diet rich in fruits/vegetables  "and lean protein, and avoidance of unhealthy habits like smoking and excessive alcohol intake. Educated patient about activating patient portal to receive education material. Patient agreed and understands that I will be providing reading material to help understand treatment.     Patient has been given crisis information including Suicide and Crisis Lifeline (call or text: 568). Patient also given instructions to go to the nearest ER or call 911 if unable to remain safe or if the Pt develops thoughts of harming self or others.    Reviewed past records regarding symptoms and treatments that have brought the patient to today's visit.     [facility]: Reviewed today to detect potential controlled substance misuse, diversion, excessive prescribing, or multiple providers prescribing controlled substances. The patients report was deemed appropriate without new medications of concerns prescribed by other providers.    Documentation entered by me for this encounter may have been done in part using C-Note Direct voice recognition transcription software. Garbled syntax, mangled pronouns, and other bizarre constructions may be attributed to that software system. Although I have made an effort to ensure accuracy, "sound like" errors may exist and should be interpreted in context.    "
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
## 2024-09-09 ENCOUNTER — CLINICAL SUPPORT (OUTPATIENT)
Dept: PSYCHIATRY | Facility: CLINIC | Age: 24
End: 2024-09-09
Payer: OTHER GOVERNMENT

## 2024-09-09 DIAGNOSIS — F41.1 GENERALIZED ANXIETY DISORDER: Primary | ICD-10-CM

## 2024-09-09 DIAGNOSIS — F42.9 OBSESSIVE-COMPULSIVE DISORDER, UNSPECIFIED TYPE: ICD-10-CM

## 2024-09-09 PROCEDURE — 99999 PR PBB SHADOW E&M-EST. PATIENT-LVL I: CPT | Mod: PBBFAC,,, | Performed by: COUNSELOR

## 2024-09-09 PROCEDURE — 99211 OFF/OP EST MAY X REQ PHY/QHP: CPT | Mod: PBBFAC,PO | Performed by: COUNSELOR

## 2024-09-09 PROCEDURE — 90791 PSYCH DIAGNOSTIC EVALUATION: CPT | Mod: ,,, | Performed by: COUNSELOR

## 2024-09-09 NOTE — PROGRESS NOTES
Individual Psychotherapy (LPC)    9/23/2024    Interim Events/Subjective Report/Content of Current Session:  follow-up appointment.    Pt is a 23 y.o. female with past psychiatric hx of  OCD and anxiety who presents for follow-up treatment.  LPC and PT engaged rapport building. Pt denied any changes.     LPC and Pt processed fears about her family/life transitions. Pt reported attached to family and pets.     LPC followed up on Anxiety. Pt has been having worries. Pt explained parents were always sweet. Pt denied never got it from Muslim.   Pt identified: Pt reported small mistakes, guilt over things.   Triggers:Pt denied any triggers specifically.   LPC and Pt processed experiencing a big move when she was little. Pt stated experiencing separation anxiety from her mom.     Pt endorsed fast food as comfort. Pt explained issues with eating too much or not enough in the past. Pt discussed food as a comfort during beginning of college. Pt identified room as her safe space.     LPC suggested for Pt to use grounding with an object and 6-4-6 breathing.    LPC and Pt addressed goals of anxiety.    Goals:  [place] with life transitions and anxiety  Current symptoms:  Depression: denies.  Anxiety: excessive worrying and obsessions/compulsions. Pt reported stepping into gum was something she was over-thinking.   Panic Attacks: Pt reported at work having more stress.   Sleep:  Pt denied. .  Flashbacks/nightmares: n/a  Zaira:  denies.  Psychosis: denies .  Hygiene: n/a  Appetite: n/a  Motivation/Energy: n/a   Therapeutic Intervention/Techniques: behavior modification, insight oriented, and supportive; relevant to diagnosis, patient responds to this modality    Will continue to follow.   Pt aware to contact LPC for any additional needs that may occur prior to next session.    Risk Parameters:  Patient reports no suicidal ideation  Patient reports no homicidal ideation  Patient reports no self-injurious behavior  Patient reports no  violent behavior    Diagnosis:   1. Obsessive-compulsive disorder, unspecified type        2. BRIA (generalized anxiety disorder)            Return to Clinic: 2 weeks  Counseling time: 45  -Call to report any worsening of symptoms or problems associated with medication.  - Pt instructed to go to ER if thoughts of harming self or others arise.   -Supportive therapy and psychoeducation provided  -Pt instructed to call clinic, 911 or go to nearest emergency room if sxs worsen or pt is in crisis. The pt expresses understanding.   Each patient to whom he or she provides medical services by telemedicine is:  (1) informed of the relationship between the physician and patient and the respective role of any other health care provider with respect to management of the patient; and (2) notified that he or she may decline to receive medical services by telemedicine and may withdraw from such care at any time.

## 2024-09-23 ENCOUNTER — CLINICAL SUPPORT (OUTPATIENT)
Dept: PSYCHIATRY | Facility: CLINIC | Age: 24
End: 2024-09-23
Payer: OTHER GOVERNMENT

## 2024-09-23 DIAGNOSIS — F42.9 OBSESSIVE-COMPULSIVE DISORDER, UNSPECIFIED TYPE: Primary | ICD-10-CM

## 2024-09-23 DIAGNOSIS — F41.1 GAD (GENERALIZED ANXIETY DISORDER): ICD-10-CM

## 2024-09-23 PROCEDURE — 90834 PSYTX W PT 45 MINUTES: CPT | Mod: ,,, | Performed by: COUNSELOR

## 2024-09-23 NOTE — PROGRESS NOTES
Individual Psychotherapy (LPC)    10/7/2024    Interim Events/Subjective Report/Content of Current Session:  follow-up appointment.    Pt is a 23 y.o. female with past psychiatric hx of  OCD and anxiety who presents for follow-up treatment.  LPC and PT engaged rapport building. Pt reported things good at work.    LPC followed up on Anxiety/OCD tendencies. Pt stated been better overall.   Triggers: Selfish thoughts, afraid something bad will happen to her cat.  Coping skills: Self-talk and grounding.     Pt reported upping Zoloft to 150mg by NP.     Pt endorsed fast food as comfort. Pt reported past 2 weeks craving fast food. Pt endorsed it as a reward.  LPC and PT discussed other types reward behaviors.    LPC and Pt discussed meal prepping and setting a budget for fast food spending. Pt reported a budget of $20 on fast food.     LPC followed up on Pt to use grounding with an object and 6-4-6 breathing. Pt explained enjoyed use of grounding.     LPC and Pt discussed happiness hormones/impacts on mental health.    LPC and Pt discussed frequency of sessions.     LPC and Pt addressed goals of anxiety.    Goals:  [place] with life transitions and anxiety  Current symptoms:  Depression: denies.  Anxiety: excessive worrying and obsessions/compulsions. Pt reported stepping into gum was something she was over-thinking.   Panic Attacks: n/a  Sleep:  Pt denied. .  Flashbacks/nightmares: n/a  Zaira:  denies.  Psychosis: denies .  Hygiene: n/a  Appetite: n/a  Motivation/Energy: n/a   Therapeutic Intervention/Techniques: behavior modification, insight oriented, and supportive; relevant to diagnosis, patient responds to this modality    Will continue to follow.   Pt aware to contact LPC for any additional needs that may occur prior to next session.    Risk Parameters:  Patient reports no suicidal ideation  Patient reports no homicidal ideation  Patient reports no self-injurious behavior  Patient reports no violent  behavior    Diagnosis:   1. Obsessive-compulsive disorder, unspecified type        2. BRIA (generalized anxiety disorder)              Return to Clinic: 3 weeks  Counseling time: 30  -Call to report any worsening of symptoms or problems associated with medication.  - Pt instructed to go to ER if thoughts of harming self or others arise.   -Supportive therapy and psychoeducation provided  -Pt instructed to call clinic, 911 or go to nearest emergency room if sxs worsen or pt is in crisis. The pt expresses understanding.   Each patient to whom he or she provides medical services by telemedicine is:  (1) informed of the relationship between the physician and patient and the respective role of any other health care provider with respect to management of the patient; and (2) notified that he or she may decline to receive medical services by telemedicine and may withdraw from such care at any time.

## 2024-09-25 ENCOUNTER — OFFICE VISIT (OUTPATIENT)
Dept: PSYCHIATRY | Facility: CLINIC | Age: 24
End: 2024-09-25
Payer: OTHER GOVERNMENT

## 2024-09-25 DIAGNOSIS — F42.9 OBSESSIVE-COMPULSIVE DISORDER, UNSPECIFIED TYPE: Primary | ICD-10-CM

## 2024-09-25 DIAGNOSIS — F41.1 GENERALIZED ANXIETY DISORDER: ICD-10-CM

## 2024-09-25 PROCEDURE — 99999 PR PBB SHADOW E&M-EST. PATIENT-LVL II: CPT | Mod: PBBFAC,,,

## 2024-09-25 PROCEDURE — 99212 OFFICE O/P EST SF 10 MIN: CPT | Mod: PBBFAC,PO

## 2024-09-25 RX ORDER — SERTRALINE HYDROCHLORIDE 100 MG/1
150 TABLET, FILM COATED ORAL DAILY
Qty: 135 TABLET | Refills: 1
Start: 2024-09-25

## 2024-09-25 NOTE — PROGRESS NOTES
"OUTPATIENT PSYCHIATRY FOLLOW UP VISIT    Encounter Date: 9/25/2024    Clinical Status of Patient:  Outpatient (Ambulatory)    Chief Complaint:  Sandie Bradley is a 23 y.o. female who presents today for follow-up.  Met with patient.      HISTORY OF PRESENTING ILLNESS:  Sandie Bradley is a 23 y.o. female with history of OCD and BRIA who presents for follow up appointment.      INITIAL HPI:  Pt. is a 23 y.o. female, with a past psychiatric hx of OCD and BRIA presenting to the clinic for an initial evaluation and treatment. PMHx outlined below. Pt is currently taking sertraline 100 mg daily.      Pt reports a history of anxiety and OCD beginning in 6th grade. She started medication and psychotherapy at that time and symptoms improved, "it was manageable." However, symptoms have markedly worsened over the last week. She reports anxiety and worry, obsessive intrusive thoughts, compulsions, crying spells, and abdominal upset. She has been unable to attend work or school over the last week. She reports feeling guilt "about random past things I shouldn't feel guilty about." She has taken sertraline for more than 10 years with the highest dose being 150 mg daily. She has taken 50 mg daily for the past year, but increased this to 100 mg daily several days ago after experiencing increased anxiety. Recent stressors contributing to worsening symptoms include the semester starting recently, her sister moved back to campus, her cat was injured, and the onset of menses. She has completed all coursework for a bachelor of arts in psychology but is unable to graduate because she declared a minor in art but has not completed this.      Plan at last appointment on 8/29/2024:  Continue sertraline 100 mg daily for BRIA and OCD  Start hydroxyzine 25 mg t.i.d. p.r.n. anxiety  Referral placed for individual psychotherapy.    Psychotropic medication history:   denies       INTERVAL HISTORY:    Today, Ms. Bradley reports she is feeling better " overall. Her mood has improved and crying spells have resolved. Anxiety is markedly decreased and well controlled. She notes only 1-2 panic attacks in the interim and notes hydroxyzine, which was started at last visit, helped to resolve these attacks.     She did start individual psychotherapy with Shelley Narvaez LPC and notes she is working through obsessions and compulsions in therapy. The obsessive intrusive feelings of guilt have improved, though residual symptoms remain. She states she is better able to rationalize obsessive intrusive thoughts since anxiety has decreased.     She notes she has decided to focus on working instead of school and has not yet received her degree, although she has completed all required coursework.     Is patient experiencing or having changes in:  Trouble with sleep:   no, sleeping well; early morning awakening with anxiety and nervous stomach sick feeling has resolved   Appetite changes: increased to baseline   Weight changes:  no  Lack of energy:   no   Anhedonia:  no  Somatic symptoms:  no  Anxiety/panic: decreased and well controlled   Irritability: no  Guilty/hopeless: no  Concentration: no  Racing thoughts: no  Impulsive behaviors: no  Paranoia/AVH: no  Self-injurious behavior/risky behavior:  no  Any drugs:  no  Alcohol:  no    No interval episodes with symptoms consistent with vasu or hypomania.  Denied interval or current suicidal/homicidal thoughts, intent, or plan or NSSI.  Denied other questions and concerns.  Patient reports feeling stable and wishing to continue current management unchanged.    Medication side effects: None  Medication adherence: yes    MEDICAL REVIEW OF SYSTEMS:   Pain: Denies any significant chronic or acute pain.  Constitutional: Denies fever or change in appetite.  Cardiovascular: Denies chest pain or exertional dyspnea.  Respiratory: Denies cough or orthopnea.   GI: Denies abdominal pain, N/V  Neurological: Denies tremor, seizure, or focal  weakness.  Psychiatric: See HPI above.    PAST PSYCHIATRIC, MEDICAL, AND SOCIAL HISTORY REVIEWED  The patient's past medical, family and social history have been reviewed and updated as appropriate within the electronic medical record - see encounter notes.    PAST MEDICAL HISTORY:   No past medical history on file. Head trauma/Loss of consciousness: denies  Seizures: denies     PAST PSYCHIATRIC HISTORY:  Psychiatric Care (current & past):  Pediatrician, peds psych, and most recently PCP   Previous Psychiatric Diagnoses:  OCD and anxiety  Previous Psychiatric Hospitalizations: denies  Previous SI/HI:  denies  Previous Suicide Attempts or NSSI: hx of cutting in 2018, denies recent NSSI  History of Psychotherapy: with Marj Neal LCSW in 2021  History of Violence: denies    FAMILY HISTORY:   Paternal: father, undiagnosed OCD; no history of substance abuse or suicide  Maternal: maternal great grandmother depression; no history of substance abuse or suicide  Siblings: sister anxiety     SOCIAL HISTORY:   Developmental/Childhood: born in [place], moved here at age 3, father in the    Marital Status/Relationship Status: single   Children: denies  Resides/Housing Status: in [place] with parents  Occupation/Employment: artist and works at [employer]   Hobbies/Recreational Activities: art and hang out with family and friends, volleyball   Spirituality/Buddhist: Voodoo   Education level: bachelor's in psychology    History: denies, father was in    Legal History: denies  Access to firearms: denies      SUBSTANCE USE HISTORY:  Caffeine: avoids d/t increase in anxiety  Tobacco: denies  Alcohol: denies  Other Substances: denies  Rehab: denies         MEDICATIONS:    Current Outpatient Medications:     hydrOXYzine HCL (ATARAX) 25 MG tablet, Take 1 tablet (25 mg total) by mouth 3 (three) times daily as needed for Anxiety., Disp: 270 tablet, Rfl: 1    pantoprazole (PROTONIX) 40 MG tablet, Take 40 mg  "by mouth once daily. (Patient not taking: Reported on 8/29/2024), Disp: , Rfl:     sertraline (ZOLOFT) 100 MG tablet, Take 1.5 tablets (150 mg total) by mouth once daily., Disp: 135 tablet, Rfl: 1    ALLERGIES:  Review of patient's allergies indicates:  No Known Allergies    EXAM:  Constitutional  Vitals:  Most recent vital signs were reviewed.   Last 3 sets of VS:      5/7/2024    11:03 AM 8/6/2024     2:11 PM 8/29/2024     9:02 AM   Vitals - 1 value per visit   SYSTOLIC 124 118 114   DIASTOLIC 80 74 77   Pulse 120 88 98   Temp 98.2 °F (36.8 °C)     Resp 16 16    SPO2 99 % 99 %    Weight (lb) 150 150 142.75   Weight (kg) 68.04 68.04 64.75   Height 5' 4" (1.626 m) 5' 4" (1.626 m) 5' 5" (1.651 m)   BMI (Calculated) 25.7 25.7 23.8   Pain Score Three  Zero      General:  unremarkable, age appropriate     Musculoskeletal  Muscle Strength/Tone:  No tremor or abnormal movements   Gait & Station:  Steady, non-ataxic     Psychiatric  Speech:  no latency; no press   Mood & Affect:  euthymic  congruent and appropriate   Thought Process:  normal and logical   Associations:  intact   Thought Content:  normal, no suicidality, no homicidality, delusions, or paranoia   Insight:  intact   Judgement: behavior is adequate to circumstances   Orientation:  grossly intact   Memory: intact for content of interview   Language: grossly intact   Attention Span & Concentration:  intact to interview   Fund of Knowledge:  not formally tested     SUICIDE RISK ASSESSMENT:  Protective factors: age, gender, no prior attempts, no prior hospitalizations, no family h/o attempts, no ongoing substance abuse, no psychosis, denies SI/intent/plan, seeking treatment, access to treatment, future oriented, good primary support, no access to firearms  Risks: hx OCD and BRIA, hx NSSI in 2018  Patient is a low immediate and long-term risk considering risk factors.     RELEVANT LABS/STUDIES:    Lab Results   Component Value Date    WBC 5.91 11/27/2023    HGB 13.8 " "11/27/2023    HCT 41.4 11/27/2023    MCV 83 11/27/2023     11/27/2023     BMP  Lab Results   Component Value Date     06/05/2024    K 4.6 06/05/2024     06/05/2024    CO2 25 06/05/2024    BUN 16 06/05/2024    CREATININE 0.74 06/05/2024    CALCIUM 10.0 06/05/2024    ANIONGAP 12 06/05/2024    ESTGFRAFRICA >60 07/06/2022    EGFRNONAA >60 07/06/2022     Lab Results   Component Value Date    ALT 20 11/27/2023    AST 27 11/27/2023    ALKPHOS 72 11/27/2023    BILITOT 0.7 11/27/2023     Lab Results   Component Value Date    TSH 0.445 06/05/2024    TSH 0.442 06/05/2024     No results found for: "LABA1C", "HGBA1C"  Lab Results   Component Value Date    CHOL 214 (H) 08/12/2021    TRIG 71 08/12/2021    HDL 60 08/12/2021    LDLCALC 139.8 08/12/2021    CHOLHDL 28.0 08/12/2021    TOTALCHOLEST 3.6 08/12/2021       IMPRESSION:    Sandie Bradley is a 23 y.o. female with history of OCD and BRIA  who presents for follow up appointment.    Status/Progress: Based on the examination today, the patient's problem(s) is/are improved and adequately but not ideally controlled.  New problems have not been presented today.   Co-morbidities are not complicating management of the primary condition.  There are no active rule-out diagnoses for this patient at this time.     Risk Parameters:  Patient reports no suicidal ideation  Patient reports no homicidal ideation  Patient reports no self-injurious behavior  Patient reports no violent behavior    DIAGNOSES:    ICD-10-CM ICD-9-CM   1. Obsessive-compulsive disorder, unspecified type  F42.9 300.3   2. Generalized anxiety disorder  F41.1 300.02       PLAN:  Increase sertraline from 100 to 150 mg daily for BRIA and OCD  Continue hydroxyzine 25 mg t.i.d. p.r.n. anxiety  Continue individual psychotherapy with Shelley Narvaez LPC      RETURN TO CLINIC:   6 weeks      ROLAND Nice, PMHNP-BC      30 minutes of total time spent on the encounter, which includes face to face time and " non-face to face time preparing to see the patient (eg. review of tests), obtaining and/or reviewing separately obtained history, documenting clinical information in the electronic health record, independently interpreting results (not separately reported), and communicating results to the patient/family/caregiver, or care coordination (not separately reported).     Visit today included managing the longitudinal care of the patient due to the serious and/or complex managed problem(s) OCD and BRIA .    At this time there are no indications the patient represents an imminent danger to either themselves or others; will continue to manage treatment in the outpatient setting.    I discussed the patient's care with the patient including benefits, alternatives, possible adverse effects of the treatment plan; including the potential for metabolic complications, major organ dysfunction, black box warnings, and contraindications. The opportunity was given for questions/clarification, and after this discussion the above treatment plan was devised through shared decision making. The patient voiced their understanding of the diagnoses and treatments listed above and agreed to the treatment plan. Follow up plan was reviewed with the patient. The patient was advised to call to report any worsening of symptoms or problems with medication.    Supportive therapy and psychoeducation provided. I discussed the importance of regular exercise, maintenance of a healthy weight, balanced diet rich in fruits/vegetables and lean protein, and avoidance of unhealthy habits like smoking and excessive alcohol intake.     Patient has been given crisis information including Suicide and Crisis Lifeline (call or text: 088). Patient also given instructions to go to the nearest ER or call 911 if unable to remain safe or if the Pt develops thoughts of harming self or others.    Documentation entered by me for this encounter may have been done in part  "using [employer] voice recognition transcription software. Garbled syntax, mangled pronouns, and other bizarre constructions may be attributed to that software system. Although I have made an effort to ensure accuracy, "sound like" errors may exist and should be interpreted in context.    "

## 2024-10-07 ENCOUNTER — CLINICAL SUPPORT (OUTPATIENT)
Dept: PSYCHIATRY | Facility: CLINIC | Age: 24
End: 2024-10-07
Payer: OTHER GOVERNMENT

## 2024-10-07 DIAGNOSIS — F41.1 GAD (GENERALIZED ANXIETY DISORDER): ICD-10-CM

## 2024-10-07 DIAGNOSIS — F42.9 OBSESSIVE-COMPULSIVE DISORDER, UNSPECIFIED TYPE: Primary | ICD-10-CM

## 2024-10-07 PROCEDURE — 90832 PSYTX W PT 30 MINUTES: CPT | Mod: ,,, | Performed by: COUNSELOR

## 2024-11-08 DIAGNOSIS — F41.1 GENERALIZED ANXIETY DISORDER: ICD-10-CM

## 2024-11-11 RX ORDER — SERTRALINE HYDROCHLORIDE 100 MG/1
150 TABLET, FILM COATED ORAL DAILY
Qty: 45 TABLET | Refills: 0
Start: 2024-11-11 | End: 2024-11-13 | Stop reason: SDUPTHER

## 2024-11-11 NOTE — TELEPHONE ENCOUNTER
Last ordered: 1 month ago (9/25/2024) by Jerrica Elizondo NP     Nov none - was due back 6wks after last visit.   Lov 9/25/24

## 2024-11-12 ENCOUNTER — PATIENT MESSAGE (OUTPATIENT)
Dept: PSYCHIATRY | Facility: CLINIC | Age: 24
End: 2024-11-12
Payer: OTHER GOVERNMENT

## 2024-11-13 DIAGNOSIS — F41.1 GENERALIZED ANXIETY DISORDER: ICD-10-CM

## 2024-11-13 RX ORDER — SERTRALINE HYDROCHLORIDE 100 MG/1
150 TABLET, FILM COATED ORAL DAILY
Qty: 135 TABLET | Refills: 1 | Status: SHIPPED | OUTPATIENT
Start: 2024-11-13 | End: 2025-11-13

## 2025-01-17 ENCOUNTER — PATIENT MESSAGE (OUTPATIENT)
Dept: PSYCHIATRY | Facility: CLINIC | Age: 25
End: 2025-01-17
Payer: OTHER GOVERNMENT

## 2025-01-18 DIAGNOSIS — F41.1 GENERALIZED ANXIETY DISORDER: ICD-10-CM

## 2025-01-21 RX ORDER — SERTRALINE HYDROCHLORIDE 100 MG/1
150 TABLET, FILM COATED ORAL DAILY
Qty: 135 TABLET | Refills: 1 | Status: SHIPPED | OUTPATIENT
Start: 2025-01-21 | End: 2026-01-21

## 2025-02-25 ENCOUNTER — OFFICE VISIT (OUTPATIENT)
Dept: PSYCHIATRY | Facility: CLINIC | Age: 25
End: 2025-02-25
Payer: OTHER GOVERNMENT

## 2025-02-25 VITALS
SYSTOLIC BLOOD PRESSURE: 108 MMHG | WEIGHT: 153.88 LBS | DIASTOLIC BLOOD PRESSURE: 72 MMHG | HEIGHT: 64 IN | BODY MASS INDEX: 26.27 KG/M2 | HEART RATE: 87 BPM

## 2025-02-25 DIAGNOSIS — F41.1 GENERALIZED ANXIETY DISORDER: ICD-10-CM

## 2025-02-25 DIAGNOSIS — F42.9 OBSESSIVE-COMPULSIVE DISORDER, UNSPECIFIED TYPE: Primary | ICD-10-CM

## 2025-02-25 PROCEDURE — 99213 OFFICE O/P EST LOW 20 MIN: CPT | Mod: PBBFAC,PO

## 2025-02-25 PROCEDURE — G2211 COMPLEX E/M VISIT ADD ON: HCPCS | Mod: S$PBB,,,

## 2025-02-25 PROCEDURE — 99215 OFFICE O/P EST HI 40 MIN: CPT | Mod: S$PBB,,,

## 2025-02-25 PROCEDURE — 99999 PR PBB SHADOW E&M-EST. PATIENT-LVL III: CPT | Mod: PBBFAC,,,

## 2025-02-25 RX ORDER — ESTRADIOL 0.1 MG/G
CREAM VAGINAL
COMMUNITY
Start: 2025-02-03

## 2025-02-25 RX ORDER — LIDOCAINE HYDROCHLORIDE 20 MG/ML
JELLY TOPICAL
COMMUNITY
Start: 2025-02-20 | End: 2026-02-20

## 2025-02-25 NOTE — PROGRESS NOTES
"OUTPATIENT PSYCHIATRY FOLLOW UP VISIT    Encounter Date: 2/25/2025    Clinical Status of Patient:  Outpatient (Ambulatory)    Chief Complaint:  Sandie Bradley is a 24 y.o. female who presents today for follow-up.  Met with patient and mother.      HISTORY OF PRESENTING ILLNESS:  Sandie Bradley is a 24 y.o. female with history of OCD and BRIA, PMH of chronic fatigue, no history of suicide attempts or behavioral health hospitalizations, history of self-injurious behavior via cutting, who presents for follow up appointment.      Plan at last appointment:  Increase sertraline from 100 to 150 mg daily for BRIA and OCD  Continue hydroxyzine 25 mg t.i.d. p.r.n. anxiety  Continue individual psychotherapy with Shelley Narvaez LPC    Psychotropic medication history:   denies       INTERVAL HISTORY:    At last visit sertraline was increased from 100 to 150 mg daily for anxiety and OCD.  She reports this helped to resolve anxiety and symptoms of OCD.  However, she did experience an exacerbation of anxiety as well as obsessions and compulsions approximately 6 weeks ago after becoming ill with the flu.  She reports symptoms lasted approximately 1 week and included "feelings of guilt about things I had no control over" and notes this led to increased generalized anxiety.      Since these symptoms resolved her mood has been stable.     She was able to get her degree. 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     Takes hydroxyzine occasionally for anxiety.  She notes this did decrease anxiety, but caused sedation, so she takes very sparingly.     Continues individual psychotherapy with Shelley Narvaez LPC and notes she is working through obsessions and compulsions in therapy.    Is patient experiencing or having changes in:  Trouble with sleep:   sleeping well  Appetite changes:no  Weight changes:  no  Lack of energy:   no   Anhedonia:  no  Somatic symptoms:  no  Anxiety/panic: generalized " anxiety  Irritability: no  Guilty/hopeless: no  Concentration: no  Racing thoughts: no  Impulsive behaviors: no  Paranoia/AVH: no  Self-injurious behavior/risky behavior:  no  Any drugs:  no  Alcohol:  no    No interval episodes with symptoms consistent with vasu or hypomania.  Denied interval or current suicidal/homicidal thoughts, intent, or plan or NSSI.  Denied other questions and concerns.  Patient reports feeling stable and wishing to continue current management unchanged.    Medication side effects: None  Medication adherence: yes    MEDICAL REVIEW OF SYSTEMS:   Pain: Denies any significant chronic or acute pain.  Constitutional: Denies fever or change in appetite.  Cardiovascular: Denies chest pain or exertional dyspnea.  Respiratory: Denies cough or orthopnea.   GI: Denies abdominal pain, N/V  Neurological: Denies tremor, seizure, or focal weakness.  Psychiatric: See HPI above.    PAST PSYCHIATRIC, MEDICAL, AND SOCIAL HISTORY REVIEWED  The patient's past medical, family and social history have been reviewed and updated as appropriate within the electronic medical record - see encounter notes.    PAST MEDICAL HISTORY:   No past medical history on file. Head trauma/Loss of consciousness: denies  Seizures: denies     PAST PSYCHIATRIC HISTORY:  Psychiatric Care (current & past):  Pediatrician, peds psych, and most recently PCP   Previous Psychiatric Diagnoses:  OCD and anxiety  Previous Psychiatric Hospitalizations: denies  Previous SI/HI:  denies  Previous Suicide Attempts or NSSI: hx of cutting in 2018, denies recent NSSI  History of Psychotherapy: with Marj Neal LCSW in 2021  History of Violence: denies    FAMILY HISTORY:   Paternal: father, undiagnosed OCD; no history of substance abuse or suicide  Maternal: maternal great grandmother depression; no history of substance abuse or suicide  Siblings: sister anxiety     SOCIAL HISTORY:   Developmental/Childhood: born in [place], moved here at age 3, father  "in the    Marital Status/Relationship Status: single   Children: denies  Resides/Housing Status: in [place] with parents  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   Last 3 sets of VS:      8/29/2024     9:02 AM 11/19/2024     1:01 PM 2/25/2025    12:00 PM   Vitals - 1 value per visit   SYSTOLIC 114 116 108   DIASTOLIC 77 70 72   Pulse 98 82 87   Temp  98.2 °F (36.8 °C)    Resp  16    SPO2  98 %    Weight (lb) 142.75 154 153.88   Weight (kg) 64.75 69.854 69.8   Height 5' 5" (1.651 m) 5' 5" (1.651 m) 5' 4" (1.626 m)   BMI (Calculated) 23.8 25.6 26.4   Pain Score Zero Zero Zero      General:  unremarkable, age appropriate     Musculoskeletal  Muscle Strength/Tone:  No tremor or abnormal movements   Gait & Station:  Steady, non-ataxic     Psychiatric  Speech:  no latency; no press   Mood & Affect:  euthymic  congruent and appropriate   Thought Process:  normal and logical   Associations:  intact   Thought Content:  normal, no suicidality, no homicidality, delusions, or paranoia   Insight:  intact   Judgement: behavior is adequate to circumstances   Orientation:  grossly intact   Memory: intact for content of interview   Language: grossly intact   Attention Span & Concentration:  intact to interview   Fund of Knowledge:  not formally tested     SUICIDE RISK ASSESSMENT:  Protective factors: age, gender, no prior attempts, no prior hospitalizations, no family h/o attempts, no ongoing substance abuse, no psychosis, " denies SI/intent/plan, seeking treatment, access to treatment, future oriented, good primary support, no access to firearms  Risks: hx OCD and BRIA, hx NSSI in 2018  Patient is a low immediate and long-term risk considering risk factors.     RELEVANT LABS/STUDIES:    Lab Results   Component Value Date    WBC 5.91 11/27/2023    HGB 13.8 11/27/2023    HCT 41.4 11/27/2023    MCV 83 11/27/2023     11/27/2023     BMP  Lab Results   Component Value Date     06/05/2024    K 4.6 06/05/2024     06/05/2024    CO2 25 06/05/2024    BUN 16 06/05/2024    CREATININE 0.74 06/05/2024    CALCIUM 10.0 06/05/2024    ANIONGAP 12 06/05/2024    ESTGFRAFRICA >60 07/06/2022    EGFRNONAA >60 07/06/2022     Lab Results   Component Value Date    ALT 20 11/27/2023    AST 27 11/27/2023    ALKPHOS 72 11/27/2023    BILITOT 0.7 11/27/2023     Lab Results   Component Value Date    TSH 0.445 06/05/2024    TSH 0.442 06/05/2024     Lab Results   Component Value Date    HGBA1C 5.0 12/17/2024     Lab Results   Component Value Date    CHOL 249 (H) 12/17/2024    TRIG 57 12/17/2024    HDL 69 (H) 12/17/2024    LDLCALC 169 (H) 12/17/2024    CHOLHDL 3.61 12/17/2024    TOTALCHOLEST 3.6 08/12/2021       IMPRESSION:    Sandie Bradley is a 24 y.o. female with history of OCD and BRIA, PMH of chronic fatigue, no history of suicide attempts or behavioral health hospitalizations, history of self-injurious behavior via cutting, who presents for follow up appointment.     Status/Progress: Based on the examination today, the patient's problem(s) is/are well controlled.  New problems have not been presented today.   Co-morbidities are not complicating management of the primary condition.  There are no active rule-out diagnoses for this patient at this time.     Risk Parameters:  Patient reports no suicidal ideation  Patient reports no homicidal ideation  Patient reports no self-injurious behavior  Patient reports no violent behavior    DIAGNOSES:     ICD-10-CM ICD-9-CM   1. Obsessive-compulsive disorder, unspecified type  F42.9 300.3   2. Generalized anxiety disorder  F41.1 300.02       PLAN:  Continue sertraline 150 mg daily for BRIA and OCD  Continue hydroxyzine 25 mg t.i.d. p.r.n. anxiety  Continue individual psychotherapy with Shelley Narvaez LPC      RETURN TO CLINIC:   3 months      Jerrica Elizondo, APRN, PMHNP-BC    53 minutes of total time spent on the encounter, which includes face to face time and non-face to face time preparing to see the patient (eg. review of tests), obtaining and/or reviewing separately obtained history, documenting clinical information in the electronic health record, independently interpreting results (not separately reported), and communicating results to the patient/family/caregiver, or care coordination (not separately reported).     Visit today included managing the longitudinal care of the patient due to the serious and/or complex managed problem(s) OCD and BRIA .    At this time there are no indications the patient represents an imminent danger to either themselves or others; will continue to manage treatment in the outpatient setting.    I discussed the patient's care with the patient including benefits, alternatives, possible adverse effects of the treatment plan; including the potential for metabolic complications, major organ dysfunction, black box warnings, and contraindications. The opportunity was given for questions/clarification, and after this discussion the above treatment plan was devised through shared decision making. The patient voiced their understanding of the diagnoses and treatments listed above and agreed to the treatment plan. Follow up plan was reviewed with the patient. The patient was advised to call to report any worsening of symptoms or problems with medication.    Supportive therapy and psychoeducation provided. I discussed the importance of regular exercise, maintenance of a healthy weight, balanced  "diet rich in fruits/vegetables and lean protein, and avoidance of unhealthy habits like smoking and excessive alcohol intake.     Patient has been given crisis information including Suicide and Crisis Lifeline (call or text: 358). Patient also given instructions to go to the nearest ER or call 911 if unable to remain safe or if the Pt develops thoughts of harming self or others.    Documentation entered by me for this encounter may have been done in part using [employer] voice recognition transcription software. Garbled syntax, mangled pronouns, and other bizarre constructions may be attributed to that software system. Although I have made an effort to ensure accuracy, "sound like" errors may exist and should be interpreted in context.  "

## 2025-05-10 DIAGNOSIS — F41.1 GENERALIZED ANXIETY DISORDER: ICD-10-CM

## 2025-05-12 RX ORDER — SERTRALINE HYDROCHLORIDE 100 MG/1
150 TABLET, FILM COATED ORAL DAILY
Qty: 135 TABLET | Refills: 1 | Status: SHIPPED | OUTPATIENT
Start: 2025-05-12 | End: 2025-05-14 | Stop reason: SDUPTHER

## 2025-05-14 DIAGNOSIS — F41.1 GENERALIZED ANXIETY DISORDER: ICD-10-CM

## 2025-05-14 RX ORDER — SERTRALINE HYDROCHLORIDE 100 MG/1
150 TABLET, FILM COATED ORAL DAILY
Qty: 45 TABLET | Refills: 11 | Status: SHIPPED | OUTPATIENT
Start: 2025-05-14 | End: 2026-05-14

## 2025-05-15 ENCOUNTER — PATIENT MESSAGE (OUTPATIENT)
Dept: PSYCHIATRY | Facility: CLINIC | Age: 25
End: 2025-05-15
Payer: OTHER GOVERNMENT

## 2025-06-10 ENCOUNTER — OFFICE VISIT (OUTPATIENT)
Dept: PSYCHIATRY | Facility: CLINIC | Age: 25
End: 2025-06-10
Payer: OTHER GOVERNMENT

## 2025-06-10 VITALS
OXYGEN SATURATION: 98 % | BODY MASS INDEX: 27.25 KG/M2 | HEIGHT: 64 IN | HEART RATE: 97 BPM | DIASTOLIC BLOOD PRESSURE: 65 MMHG | WEIGHT: 159.63 LBS | SYSTOLIC BLOOD PRESSURE: 118 MMHG

## 2025-06-10 DIAGNOSIS — F41.1 GENERALIZED ANXIETY DISORDER: ICD-10-CM

## 2025-06-10 DIAGNOSIS — F42.9 OBSESSIVE-COMPULSIVE DISORDER, UNSPECIFIED TYPE: Primary | ICD-10-CM

## 2025-06-10 PROCEDURE — 99213 OFFICE O/P EST LOW 20 MIN: CPT | Mod: PBBFAC,PO

## 2025-06-10 PROCEDURE — 99999 PR PBB SHADOW E&M-EST. PATIENT-LVL III: CPT | Mod: PBBFAC,,,

## 2025-06-10 PROCEDURE — G2211 COMPLEX E/M VISIT ADD ON: HCPCS | Mod: ,,,

## 2025-06-10 PROCEDURE — 99214 OFFICE O/P EST MOD 30 MIN: CPT | Mod: S$PBB,,,

## 2025-06-10 RX ORDER — SERTRALINE HYDROCHLORIDE 100 MG/1
150 TABLET, FILM COATED ORAL DAILY
Qty: 135 TABLET | Refills: 1 | Status: SHIPPED | OUTPATIENT
Start: 2025-06-10 | End: 2025-06-10

## 2025-06-10 RX ORDER — SERTRALINE HYDROCHLORIDE 100 MG/1
100 TABLET, FILM COATED ORAL DAILY
Qty: 90 TABLET | Refills: 1 | Status: SHIPPED | OUTPATIENT
Start: 2025-06-10 | End: 2025-06-10 | Stop reason: SDUPTHER

## 2025-06-10 RX ORDER — SERTRALINE HYDROCHLORIDE 100 MG/1
100 TABLET, FILM COATED ORAL DAILY
Qty: 90 TABLET | Refills: 1 | Status: SHIPPED | OUTPATIENT
Start: 2025-06-10 | End: 2026-06-10

## 2025-06-10 NOTE — PROGRESS NOTES
OUTPATIENT PSYCHIATRY FOLLOW UP VISIT    Encounter Date: 6/10/2025    Clinical Status of Patient:  Outpatient (Ambulatory)    Chief Complaint:  Sandie Bradley is a 24 y.o. female who presents today for follow-up.  Met with patient and mother.      HISTORY OF PRESENTING ILLNESS:  Sandie Bradley is a 24 y.o. female with history of OCD and BRIA, PMH of chronic fatigue, no history of suicide attempts or behavioral health hospitalizations, history of self-injurious behavior via cutting, who presents for follow up appointment.      Plan at last appointment:  Continue sertraline 150 mg daily for BRIA and OCD  Continue hydroxyzine 25 mg t.i.d. p.r.n. anxiety  Continue individual psychotherapy with Shelley Narvaez LPC    Psychotropic medication history:   denies       INTERVAL HISTORY:    Patient reports her mood has been good, with no episodes of elevated anxiety, obsessions, or compulsions since her last visit. She experienced the beginnings of anxiety and obsessive thoughts on two occasions but managed these without escalation. Patient has not needed to use hydroxyzine for anxiety since the last visit, indicating improved anxiety control. She previously experienced exacerbation of obsessions and compulsions when ill with the flu, but since then has had only minor instances of anxiety and intrusive thoughts, which she successfully managed. 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 She is no longer seeing her previous therapist, Shelley, as she felt it was not a good fit, and expresses interest in finding a therapist specializing in OCD. Patient has reduced her Zoloft dosage from 150mg to 100mg daily for about three weeks, reporting stable mood with only minor fluctuations attributed to menstruation. She reports good sleep, normal energy levels during the day, and good concentration. Patient denies significant changes in appetite or  weight, irritability, frustration, feelings of guilt or hopelessness, and any major mood disturbances since reducing medication dosage.    MEDICATIONS:  Patient is on Zoloft 100 mg, taking one tablet daily orally for mood management. She auto-decreased from 150 mg daily, which she had been taking for about 3 weeks. She is also prescribed Hydroxyzine as needed for anxiety, though she has not used it recently.    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 She previously worked for [employer]. Her hobby aligns with her work, as she runs a business doing [employer].    Is patient experiencing or having changes in:  Trouble with sleep:   sleeping well  Appetite changes:no  Weight changes:  no  Lack of energy:   no   Anhedonia:  no  Somatic symptoms:  no  Anxiety/panic: decreased and well controlled  Irritability: no  Guilty/hopeless: no  Concentration: no  Racing thoughts: no  Impulsive behaviors: no  Paranoia/AVH: no  Self-injurious behavior/risky behavior:  no  Any drugs:  no  Alcohol:  no    No interval episodes with symptoms consistent with vasu or hypomania.  Denied interval or current suicidal/homicidal thoughts, intent, or plan or NSSI.  Denied other questions and concerns.  Patient reports feeling stable and wishing to continue current management unchanged.    Medication side effects: None  Medication adherence: yes    MEDICAL REVIEW OF SYSTEMS:   General: -fever, -chills, -fatigue, -weight gain, -weight loss  Eyes: -vision changes, -redness, -discharge  ENT: -ear pain, -nasal congestion, -sore throat  Cardiovascular: -chest pain, -palpitations, -lower extremity edema  Respiratory: -cough, -shortness of breath  Gastrointestinal: -abdominal pain, -nausea, -vomiting, -diarrhea, -constipation, -blood in stool  Genitourinary: -dysuria, -hematuria, -frequency  Musculoskeletal: -joint pain, -muscle pain  Skin: -rash, -lesion  Neurological: -headache, -dizziness, -numbness,  -tingling  Psychiatric: +anxiety, -depression, -sleep difficulty, +mood swings     PAST PSYCHIATRIC, MEDICAL, AND SOCIAL HISTORY REVIEWED  The patient's past medical, family and social history have been reviewed and updated as appropriate within the electronic medical record - see encounter notes.    PAST MEDICAL HISTORY:   No past medical history on file. Head trauma/Loss of consciousness: denies  Seizures: denies     PAST PSYCHIATRIC HISTORY:  Psychiatric Care (current & past):  Pediatrician, peds psych, and most recently PCP   Previous Psychiatric Diagnoses:  OCD and anxiety  Previous Psychiatric Hospitalizations: denies  Previous SI/HI:  denies  Previous Suicide Attempts or NSSI: hx of cutting in 2018, denies recent NSSI  History of Psychotherapy: with Marj Neal LCSW in 2021  History of Violence: denies    FAMILY HISTORY:   Paternal: father, undiagnosed OCD; no history of substance abuse or suicide  Maternal: maternal great grandmother depression; no history of substance abuse or suicide  Siblings: sister anxiety     SOCIAL HISTORY:   Developmental/Childhood: born in [place], moved here at age 3, father in the    Marital Status/Relationship Status: single   Children: denies  Resides/Housing Status: in [place] with parents  Occupation/Employment: artist and works at [employer]   Hobbies/Recreational Activities: art and hang out with family and friends, volleyball   Spirituality/Muslim: Moravian   Education level: bachelor's in psychology    History: denies, father was in    Legal History: denies  Access to firearms: denies      SUBSTANCE USE HISTORY:  Caffeine: avoids d/t increase in anxiety  Tobacco: denies  Alcohol: denies  Other Substances: denies  Rehab: denies     EXAM:  Constitutional  Vitals:  Most recent vital signs were reviewed.   Last 3 sets of VS:      11/19/2024     1:01 PM 2/25/2025    12:00 PM 6/10/2025     1:01 PM   Vitals - 1 value per visit   SYSTOLIC 116 108 118  "  DIASTOLIC 70 72 65   Pulse 82 87 97   Temp 98.2 °F (36.8 °C)     Resp 16     SPO2 98 %  98 %   Weight (lb) 154 153.88 159.61   Weight (kg) 69.854 69.8 72.4   Height 5' 5" (1.651 m) 5' 4" (1.626 m) 5' 4" (1.626 m)   BMI (Calculated) 25.6 26.4 27.4   Pain Score Zero Zero       General:  unremarkable, age appropriate     Musculoskeletal  Muscle Strength/Tone:  No tremor or abnormal movements   Gait & Station:  Steady, non-ataxic     Psychiatric  Speech:  no latency; no press   Mood & Affect:  euthymic  congruent and appropriate   Thought Process:  normal and logical   Associations:  intact   Thought Content:  normal, no suicidality, no homicidality, delusions, or paranoia   Insight:  intact   Judgement: behavior is adequate to circumstances   Orientation:  grossly intact   Memory: intact for content of interview   Language: grossly intact   Attention Span & Concentration:  intact to interview   Fund of Knowledge:  not formally tested     SUICIDE RISK ASSESSMENT:  Protective factors: age, gender, no prior attempts, no prior hospitalizations, no family h/o attempts, no ongoing substance abuse, no psychosis, denies SI/intent/plan, seeking treatment, access to treatment, future oriented, good primary support, no access to firearms  Risks: hx OCD and BRIA, hx NSSI in 2018  Patient is a low immediate and long-term risk considering risk factors.     RELEVANT LABS/STUDIES:    Lab Results   Component Value Date    WBC 5.91 11/27/2023    HGB 13.8 11/27/2023    HCT 41.4 11/27/2023    MCV 83 11/27/2023     11/27/2023     BMP  Lab Results   Component Value Date     06/05/2024    K 4.6 06/05/2024     06/05/2024    CO2 25 06/05/2024    BUN 16 06/05/2024    CREATININE 0.74 06/05/2024    CALCIUM 10.0 06/05/2024    ANIONGAP 12 06/05/2024    ESTGFRAFRICA >60 07/06/2022    EGFRNONAA >60 07/06/2022     Lab Results   Component Value Date    ALT 20 11/27/2023    AST 27 11/27/2023    ALKPHOS 72 11/27/2023    BILITOT 0.7 " 11/27/2023     Lab Results   Component Value Date    TSH 0.445 06/05/2024    TSH 0.442 06/05/2024     Lab Results   Component Value Date    HGBA1C 5.0 12/17/2024     Lab Results   Component Value Date    CHOL 249 (H) 12/17/2024    TRIG 57 12/17/2024    HDL 69 (H) 12/17/2024    LDLCALC 169 (H) 12/17/2024    CHOLHDL 3.61 12/17/2024    TOTALCHOLEST 3.6 08/12/2021       IMPRESSION:    Sandie Bradley is a 24 y.o. female with history of OCD and BRIA, PMH of chronic fatigue, no history of suicide attempts or behavioral health hospitalizations, history of self-injurious behavior via cutting, who presents for follow up appointment.     Status/Progress: Based on the examination today, the patient's problem(s) is/are well controlled.  New problems have not been presented today.   Co-morbidities are not complicating management of the primary condition.  There are no active rule-out diagnoses for this patient at this time.     - Mood stable with no episodes since last visit in February.  - Able to manage mild anxiety symptoms without medication intervention.  - Auto-decreased Zoloft from 150 mg to 100 mg daily with no significant mood changes observed.  - Considered further dose reduction in the future if stability maintained.    Risk Parameters:  Patient reports no suicidal ideation  Patient reports no homicidal ideation  Patient reports no self-injurious behavior  Patient reports no violent behavior    DIAGNOSES:    ICD-10-CM ICD-9-CM   1. Obsessive-compulsive disorder, unspecified type  F42.9 300.3   2. Generalized anxiety disorder  F41.1 300.02       PLAN:  Continue sertraline 100 mg daily for BRIA and OCD  Continue hydroxyzine 25 mg t.i.d. p.r.n. anxiety  Pt has discontinued individual psychotherapy with Shelley Narvaez LPC (not a good fit)  Pt given information on [employer] where she can search for local therapists specializing in OCD      RETURN TO CLINIC:   3 months      ROLAND Nice, PMHNP-BC    30  minutes of total time spent on the encounter, which includes face to face time and non-face to face time preparing to see the patient (eg. review of tests), obtaining and/or reviewing separately obtained history, documenting clinical information in the electronic health record, independently interpreting results (not separately reported), and communicating results to the patient/family/caregiver, or care coordination (not separately reported).     Visit today included managing the longitudinal care of the patient due to the serious and/or complex managed problem(s) OCD and BRIA .    At this time there are no indications the patient represents an imminent danger to either themselves or others; will continue to manage treatment in the outpatient setting.    I discussed the patient's care with the patient including benefits, alternatives, possible adverse effects of the treatment plan; including the potential for metabolic complications, major organ dysfunction, black box warnings, and contraindications. The opportunity was given for questions/clarification, and after this discussion the above treatment plan was devised through shared decision making. The patient voiced their understanding of the diagnoses and treatments listed above and agreed to the treatment plan. Follow up plan was reviewed with the patient. The patient was advised to call to report any worsening of symptoms or problems with medication.    Patient has been given crisis information including Suicide and Crisis Lifeline (call or text: 536). Patient also given instructions to go to the nearest ER or call 911 if unable to remain safe or if the Pt develops thoughts of harming self or others.    This note was generated with the assistance of ambient listening technology. Verbal consent was obtained by the patient and accompanying visitor(s) for the recording of patient appointment to facilitate this note. I attest to having reviewed and edited the  generated note for accuracy, though some syntax or spelling errors may persist. Please contact the author of this note for any clarification.